# Patient Record
Sex: FEMALE | Race: WHITE | Employment: FULL TIME | URBAN - METROPOLITAN AREA
[De-identification: names, ages, dates, MRNs, and addresses within clinical notes are randomized per-mention and may not be internally consistent; named-entity substitution may affect disease eponyms.]

---

## 2017-01-01 ENCOUNTER — APPOINTMENT (OUTPATIENT)
Dept: LAB | Facility: HOSPITAL | Age: 67
End: 2017-01-01
Attending: UROLOGY
Payer: MEDICARE

## 2017-01-01 ENCOUNTER — APPOINTMENT (OUTPATIENT)
Dept: CT IMAGING | Facility: HOSPITAL | Age: 67
End: 2017-01-01
Attending: EMERGENCY MEDICINE

## 2017-01-01 ENCOUNTER — TELEPHONE (OUTPATIENT)
Dept: SURGERY | Facility: CLINIC | Age: 67
End: 2017-01-01

## 2017-01-01 ENCOUNTER — LAB ENCOUNTER (OUTPATIENT)
Dept: LAB | Facility: HOSPITAL | Age: 67
End: 2017-01-01
Attending: INTERNAL MEDICINE
Payer: MEDICARE

## 2017-01-01 ENCOUNTER — HOSPITAL ENCOUNTER (EMERGENCY)
Facility: HOSPITAL | Age: 67
Discharge: HOME | End: 2017-01-01
Attending: EMERGENCY MEDICINE

## 2017-01-01 ENCOUNTER — APPOINTMENT (OUTPATIENT)
Dept: INTERVENTIONAL RADIOLOGY/VASCULAR | Facility: HOSPITAL | Age: 67
DRG: 853 | End: 2017-01-01
Attending: UROLOGY
Payer: MEDICARE

## 2017-01-01 ENCOUNTER — APPOINTMENT (OUTPATIENT)
Dept: CV DIAGNOSTICS | Facility: HOSPITAL | Age: 67
DRG: 853 | End: 2017-01-01
Attending: INTERNAL MEDICINE
Payer: MEDICARE

## 2017-01-01 ENCOUNTER — APPOINTMENT (OUTPATIENT)
Dept: LAB | Age: 67
End: 2017-01-01
Attending: UROLOGY
Payer: MEDICARE

## 2017-01-01 ENCOUNTER — APPOINTMENT (OUTPATIENT)
Dept: CT IMAGING | Facility: HOSPITAL | Age: 67
DRG: 853 | End: 2017-01-01
Attending: UROLOGY
Payer: MEDICARE

## 2017-01-01 ENCOUNTER — HOSPITAL ENCOUNTER (INPATIENT)
Facility: HOSPITAL | Age: 67
LOS: 3 days | Discharge: HOSPICE | DRG: 853 | End: 2017-01-01
Attending: EMERGENCY MEDICINE | Admitting: INTERNAL MEDICINE
Payer: MEDICARE

## 2017-01-01 ENCOUNTER — HOSPITAL ENCOUNTER (INPATIENT)
Facility: HOSPITAL | Age: 67
LOS: 1 days | DRG: 871 | End: 2017-01-01
Attending: INTERNAL MEDICINE | Admitting: INTERNAL MEDICINE
Payer: OTHER MISCELLANEOUS

## 2017-01-01 ENCOUNTER — APPOINTMENT (OUTPATIENT)
Dept: GENERAL RADIOLOGY | Facility: HOSPITAL | Age: 67
DRG: 853 | End: 2017-01-01
Attending: EMERGENCY MEDICINE
Payer: MEDICARE

## 2017-01-01 ENCOUNTER — APPOINTMENT (OUTPATIENT)
Dept: ULTRASOUND IMAGING | Facility: HOSPITAL | Age: 67
DRG: 853 | End: 2017-01-01
Attending: INTERNAL MEDICINE
Payer: MEDICARE

## 2017-01-01 ENCOUNTER — OFFICE VISIT (OUTPATIENT)
Dept: SURGERY | Facility: CLINIC | Age: 67
End: 2017-01-01

## 2017-01-01 ENCOUNTER — APPOINTMENT (OUTPATIENT)
Dept: GENERAL RADIOLOGY | Facility: HOSPITAL | Age: 67
DRG: 853 | End: 2017-01-01
Attending: INTERNAL MEDICINE
Payer: MEDICARE

## 2017-01-01 ENCOUNTER — OFFICE VISIT (OUTPATIENT)
Dept: PAIN CLINIC | Facility: HOSPITAL | Age: 67
End: 2017-01-01
Attending: ANESTHESIOLOGY
Payer: MEDICARE

## 2017-01-01 ENCOUNTER — APPOINTMENT (OUTPATIENT)
Dept: CT IMAGING | Facility: HOSPITAL | Age: 67
DRG: 853 | End: 2017-01-01
Attending: EMERGENCY MEDICINE
Payer: MEDICARE

## 2017-01-01 ENCOUNTER — HOSPITAL ENCOUNTER (OUTPATIENT)
Dept: MRI IMAGING | Facility: HOSPITAL | Age: 67
Discharge: HOME | End: 2017-01-01
Attending: OBSTETRICS & GYNECOLOGY
Payer: MEDICARE

## 2017-01-01 ENCOUNTER — TELEPHONE (OUTPATIENT)
Dept: HEMATOLOGY/ONCOLOGY | Facility: HOSPITAL | Age: 67
End: 2017-01-01

## 2017-01-01 ENCOUNTER — TELEPHONE (OUTPATIENT)
Dept: PAIN CLINIC | Facility: HOSPITAL | Age: 67
End: 2017-01-01

## 2017-01-01 ENCOUNTER — HOSPITAL ENCOUNTER (EMERGENCY)
Facility: HOSPITAL | Age: 67
Discharge: HOME | End: 2017-01-01
Attending: EMERGENCY MEDICINE
Payer: MEDICARE

## 2017-01-01 VITALS
WEIGHT: 182 LBS | DIASTOLIC BLOOD PRESSURE: 82 MMHG | TEMPERATURE: 98 F | SYSTOLIC BLOOD PRESSURE: 138 MMHG | RESPIRATION RATE: 16 BRPM | HEIGHT: 62 IN | HEART RATE: 83 BPM | BODY MASS INDEX: 33.49 KG/M2

## 2017-01-01 VITALS
SYSTOLIC BLOOD PRESSURE: 136 MMHG | BODY MASS INDEX: 33.13 KG/M2 | TEMPERATURE: 98 F | RESPIRATION RATE: 16 BRPM | WEIGHT: 180 LBS | DIASTOLIC BLOOD PRESSURE: 74 MMHG | HEIGHT: 62 IN | HEART RATE: 82 BPM

## 2017-01-01 VITALS
DIASTOLIC BLOOD PRESSURE: 79 MMHG | RESPIRATION RATE: 16 BRPM | SYSTOLIC BLOOD PRESSURE: 132 MMHG | BODY MASS INDEX: 33.99 KG/M2 | HEART RATE: 92 BPM | WEIGHT: 180 LBS | TEMPERATURE: 98 F | HEIGHT: 61 IN

## 2017-01-01 VITALS
HEART RATE: 90 BPM | DIASTOLIC BLOOD PRESSURE: 66 MMHG | TEMPERATURE: 98 F | SYSTOLIC BLOOD PRESSURE: 135 MMHG | OXYGEN SATURATION: 97 % | BODY MASS INDEX: 34 KG/M2 | WEIGHT: 180 LBS | RESPIRATION RATE: 16 BRPM

## 2017-01-01 VITALS
BODY MASS INDEX: 33.99 KG/M2 | HEIGHT: 61 IN | DIASTOLIC BLOOD PRESSURE: 53 MMHG | RESPIRATION RATE: 16 BRPM | SYSTOLIC BLOOD PRESSURE: 133 MMHG | WEIGHT: 180 LBS | HEART RATE: 107 BPM | OXYGEN SATURATION: 96 % | TEMPERATURE: 99 F

## 2017-01-01 VITALS
HEART RATE: 97 BPM | RESPIRATION RATE: 16 BRPM | BODY MASS INDEX: 35 KG/M2 | DIASTOLIC BLOOD PRESSURE: 79 MMHG | SYSTOLIC BLOOD PRESSURE: 131 MMHG | WEIGHT: 183 LBS

## 2017-01-01 VITALS
SYSTOLIC BLOOD PRESSURE: 121 MMHG | TEMPERATURE: 97 F | RESPIRATION RATE: 21 BRPM | DIASTOLIC BLOOD PRESSURE: 48 MMHG | HEART RATE: 103 BPM | HEIGHT: 62 IN | OXYGEN SATURATION: 94 % | BODY MASS INDEX: 33.13 KG/M2 | WEIGHT: 180 LBS

## 2017-01-01 VITALS
BODY MASS INDEX: 33.99 KG/M2 | WEIGHT: 180 LBS | HEART RATE: 82 BPM | SYSTOLIC BLOOD PRESSURE: 142 MMHG | HEIGHT: 61 IN | DIASTOLIC BLOOD PRESSURE: 86 MMHG

## 2017-01-01 VITALS — OXYGEN SATURATION: 94 %

## 2017-01-01 DIAGNOSIS — M79.604 PAIN IN BOTH LOWER EXTREMITIES: ICD-10-CM

## 2017-01-01 DIAGNOSIS — N36.9 URETHRA DISORDER: ICD-10-CM

## 2017-01-01 DIAGNOSIS — N30.00 ACUTE CYSTITIS WITHOUT HEMATURIA: Primary | ICD-10-CM

## 2017-01-01 DIAGNOSIS — R52 PAIN: ICD-10-CM

## 2017-01-01 DIAGNOSIS — E86.0 DEHYDRATION: Primary | ICD-10-CM

## 2017-01-01 DIAGNOSIS — R31.9 URINARY TRACT INFECTION WITH HEMATURIA, SITE UNSPECIFIED: ICD-10-CM

## 2017-01-01 DIAGNOSIS — N32.89 BLADDER MASS: Primary | ICD-10-CM

## 2017-01-01 DIAGNOSIS — R35.0 URINE FREQUENCY: Primary | ICD-10-CM

## 2017-01-01 DIAGNOSIS — I10 ESSENTIAL HYPERTENSION, BENIGN: ICD-10-CM

## 2017-01-01 DIAGNOSIS — R73.09 OTHER ABNORMAL GLUCOSE: ICD-10-CM

## 2017-01-01 DIAGNOSIS — N30.00 ACUTE CYSTITIS WITHOUT HEMATURIA: ICD-10-CM

## 2017-01-01 DIAGNOSIS — M54.50 CHRONIC MIDLINE LOW BACK PAIN WITHOUT SCIATICA: Primary | ICD-10-CM

## 2017-01-01 DIAGNOSIS — C80.1 SMALL CELL CARCINOMA (HCC): ICD-10-CM

## 2017-01-01 DIAGNOSIS — R25.2 LEG CRAMPS: Primary | ICD-10-CM

## 2017-01-01 DIAGNOSIS — N39.0 URINARY TRACT INFECTION WITH HEMATURIA, SITE UNSPECIFIED: ICD-10-CM

## 2017-01-01 DIAGNOSIS — R31.0 GROSS HEMATURIA: ICD-10-CM

## 2017-01-01 DIAGNOSIS — R19.07 ABDOMINAL SWELLING, GENERALIZED: ICD-10-CM

## 2017-01-01 DIAGNOSIS — R39.9 SYMPTOMS INVOLVING URINARY SYSTEM: ICD-10-CM

## 2017-01-01 DIAGNOSIS — R31.0 GROSS HEMATURIA: Primary | ICD-10-CM

## 2017-01-01 DIAGNOSIS — N81.6 PROCTOCELE: Primary | ICD-10-CM

## 2017-01-01 DIAGNOSIS — R11.0 NAUSEA: ICD-10-CM

## 2017-01-01 DIAGNOSIS — R11.2 NAUSEA AND VOMITING, INTRACTABILITY OF VOMITING NOT SPECIFIED, UNSPECIFIED VOMITING TYPE: ICD-10-CM

## 2017-01-01 DIAGNOSIS — G89.29 CHRONIC MIDLINE LOW BACK PAIN WITHOUT SCIATICA: Primary | ICD-10-CM

## 2017-01-01 DIAGNOSIS — N39.0 URINARY TRACT INFECTION: Primary | ICD-10-CM

## 2017-01-01 DIAGNOSIS — R10.2 SUPRAPUBIC PAIN: ICD-10-CM

## 2017-01-01 DIAGNOSIS — M79.605 PAIN IN BOTH LOWER EXTREMITIES: ICD-10-CM

## 2017-01-01 DIAGNOSIS — N32.89 BLADDER MASS: ICD-10-CM

## 2017-01-01 DIAGNOSIS — R82.90 URINE FINDING: ICD-10-CM

## 2017-01-01 DIAGNOSIS — N30.01 ACUTE CYSTITIS WITH HEMATURIA: Primary | ICD-10-CM

## 2017-01-01 DIAGNOSIS — R31.9 HEMATURIA, UNSPECIFIED: ICD-10-CM

## 2017-01-01 LAB
ALBUMIN SERPL BCP-MCNC: 3.5 G/DL (ref 3.5–4.8)
ALBUMIN/GLOB SERPL: 0.9 {RATIO} (ref 1–2)
ALP SERPL-CCNC: 44 U/L (ref 32–100)
ALT SERPL-CCNC: 18 U/L (ref 14–54)
ANION GAP SERPL CALC-SCNC: 15 MMOL/L (ref 0–18)
APPEARANCE: CLEAR
APPEARANCE: CLEAR
AST SERPL-CCNC: 22 U/L (ref 15–41)
BACTERIA UR QL AUTO: NEGATIVE /HPF
BASOPHILS # BLD: 0 K/UL (ref 0–0.2)
BASOPHILS NFR BLD: 0 %
BILIRUB SERPL-MCNC: 1.5 MG/DL (ref 0.3–1.2)
BILIRUB UR QL: NEGATIVE
BILIRUB UR QL: NEGATIVE
BUN SERPL-MCNC: 14 MG/DL (ref 8–20)
BUN/CREAT SERPL: 17.7 (ref 10–20)
CALCIUM SERPL-MCNC: 8.9 MG/DL (ref 8.5–10.5)
CHLORIDE SERPL-SCNC: 99 MMOL/L (ref 95–110)
CO2 SERPL-SCNC: 23 MMOL/L (ref 22–32)
CREAT SERPL-MCNC: 0.79 MG/DL (ref 0.5–1.5)
EOSINOPHIL # BLD: 0 K/UL (ref 0–0.7)
EOSINOPHIL NFR BLD: 0 %
ERYTHROCYTE [DISTWIDTH] IN BLOOD BY AUTOMATED COUNT: 13.6 % (ref 11–15)
GLOBULIN PLAS-MCNC: 4 G/DL (ref 2.5–3.7)
GLUCOSE SERPL-MCNC: 120 MG/DL (ref 70–99)
GLUCOSE UR-MCNC: NEGATIVE MG/DL
GLUCOSE UR-MCNC: NEGATIVE MG/DL
HCT VFR BLD AUTO: 35.9 % (ref 35–48)
HGB BLD-MCNC: 12.4 G/DL (ref 12–16)
KETONES UR-MCNC: NEGATIVE MG/DL
LYMPHOCYTES # BLD: 0.9 K/UL (ref 1–4)
LYMPHOCYTES NFR BLD: 10 %
MCH RBC QN AUTO: 30.7 PG (ref 27–32)
MCHC RBC AUTO-ENTMCNC: 34.4 G/DL (ref 32–37)
MCV RBC AUTO: 89.1 FL (ref 80–100)
MONOCYTES # BLD: 0.9 K/UL (ref 0–1)
MONOCYTES NFR BLD: 10 %
MULTISTIX LOT#: NORMAL NUMERIC
MULTISTIX LOT#: NORMAL NUMERIC
NEUTROPHILS # BLD AUTO: 7 K/UL (ref 1.8–7.7)
NEUTROPHILS NFR BLD: 79 %
NITRITE UR QL STRIP.AUTO: NEGATIVE
OSMOLALITY UR CALC.SUM OF ELEC: 286 MOSM/KG (ref 275–295)
PH UR: 5 [PH] (ref 5–8)
PH UR: 5 [PH] (ref 5–8)
PH, URINE: 5.5 (ref 4.5–8)
PH, URINE: 6 (ref 4.5–8)
PLATELET # BLD AUTO: 219 K/UL (ref 140–400)
PMV BLD AUTO: 7.5 FL (ref 7.4–10.3)
POTASSIUM SERPL-SCNC: 3.6 MMOL/L (ref 3.3–5.1)
PROT SERPL-MCNC: 7.5 G/DL (ref 5.9–8.4)
PROT UR-MCNC: 100 MG/DL
PROT UR-MCNC: >=500 MG/DL
RBC # BLD AUTO: 4.03 M/UL (ref 3.7–5.4)
RBC #/AREA URNS AUTO: 1052 /HPF
RBC #/AREA URNS AUTO: 2467 /HPF
SODIUM SERPL-SCNC: 137 MMOL/L (ref 136–144)
SP GR UR STRIP: 1.02 (ref 1–1.03)
SP GR UR STRIP: 1.03 (ref 1–1.03)
SPECIFIC GRAVITY: 1.02 (ref 1–1.03)
SPECIFIC GRAVITY: 1.03 (ref 1–1.03)
URINE-COLOR: YELLOW
URINE-COLOR: YELLOW
UROBILINOGEN UR STRIP-ACNC: <2
UROBILINOGEN,SEMI-QN: 0.2 MG/DL (ref 0–1.9)
UROBILINOGEN,SEMI-QN: 0.2 MG/DL (ref 0–1.9)
VIT C UR-MCNC: NEGATIVE MG/DL
WBC # BLD AUTO: 8.9 K/UL (ref 4–11)
WBC #/AREA URNS AUTO: 1291 /HPF
WBC #/AREA URNS AUTO: 148 /HPF

## 2017-01-01 PROCEDURE — 76770 US EXAM ABDO BACK WALL COMP: CPT | Performed by: INTERNAL MEDICINE

## 2017-01-01 PROCEDURE — 87086 URINE CULTURE/COLONY COUNT: CPT

## 2017-01-01 PROCEDURE — 99222 1ST HOSP IP/OBS MODERATE 55: CPT | Performed by: INTERNAL MEDICINE

## 2017-01-01 PROCEDURE — 93306 TTE W/DOPPLER COMPLETE: CPT | Performed by: INTERNAL MEDICINE

## 2017-01-01 PROCEDURE — 80053 COMPREHEN METABOLIC PANEL: CPT

## 2017-01-01 PROCEDURE — 99284 EMERGENCY DEPT VISIT MOD MDM: CPT

## 2017-01-01 PROCEDURE — 81003 URINALYSIS AUTO W/O SCOPE: CPT | Performed by: UROLOGY

## 2017-01-01 PROCEDURE — 88108 CYTOPATH CONCENTRATE TECH: CPT

## 2017-01-01 PROCEDURE — 96374 THER/PROPH/DIAG INJ IV PUSH: CPT

## 2017-01-01 PROCEDURE — 99223 1ST HOSP IP/OBS HIGH 75: CPT | Performed by: UROLOGY

## 2017-01-01 PROCEDURE — 80061 LIPID PANEL: CPT

## 2017-01-01 PROCEDURE — A9575 INJ GADOTERATE MEGLUMI 0.1ML: HCPCS | Performed by: OBSTETRICS & GYNECOLOGY

## 2017-01-01 PROCEDURE — 99213 OFFICE O/P EST LOW 20 MIN: CPT | Performed by: UROLOGY

## 2017-01-01 PROCEDURE — 99233 SBSQ HOSP IP/OBS HIGH 50: CPT | Performed by: INTERNAL MEDICINE

## 2017-01-01 PROCEDURE — 74176 CT ABD & PELVIS W/O CONTRAST: CPT

## 2017-01-01 PROCEDURE — 99232 SBSQ HOSP IP/OBS MODERATE 35: CPT | Performed by: UROLOGY

## 2017-01-01 PROCEDURE — 85027 COMPLETE CBC AUTOMATED: CPT | Performed by: EMERGENCY MEDICINE

## 2017-01-01 PROCEDURE — 81001 URINALYSIS AUTO W/SCOPE: CPT

## 2017-01-01 PROCEDURE — G0463 HOSPITAL OUTPT CLINIC VISIT: HCPCS | Performed by: UROLOGY

## 2017-01-01 PROCEDURE — 96361 HYDRATE IV INFUSION ADD-ON: CPT

## 2017-01-01 PROCEDURE — 83036 HEMOGLOBIN GLYCOSYLATED A1C: CPT

## 2017-01-01 PROCEDURE — 99221 1ST HOSP IP/OBS SF/LOW 40: CPT | Performed by: INTERNAL MEDICINE

## 2017-01-01 PROCEDURE — 71250 CT THORAX DX C-: CPT | Performed by: UROLOGY

## 2017-01-01 PROCEDURE — 079J3ZX DRAINAGE OF LEFT INGUINAL LYMPHATIC, PERCUTANEOUS APPROACH, DIAGNOSTIC: ICD-10-PCS | Performed by: RADIOLOGY

## 2017-01-01 PROCEDURE — 80053 COMPREHEN METABOLIC PANEL: CPT | Performed by: EMERGENCY MEDICINE

## 2017-01-01 PROCEDURE — 99232 SBSQ HOSP IP/OBS MODERATE 35: CPT | Performed by: INTERNAL MEDICINE

## 2017-01-01 PROCEDURE — 81002 URINALYSIS NONAUTO W/O SCOPE: CPT | Performed by: UROLOGY

## 2017-01-01 PROCEDURE — 99211 OFF/OP EST MAY X REQ PHY/QHP: CPT

## 2017-01-01 PROCEDURE — 72197 MRI PELVIS W/O & W/DYE: CPT | Performed by: OBSTETRICS & GYNECOLOGY

## 2017-01-01 PROCEDURE — 96376 TX/PRO/DX INJ SAME DRUG ADON: CPT

## 2017-01-01 PROCEDURE — 99233 SBSQ HOSP IP/OBS HIGH 50: CPT | Performed by: UROLOGY

## 2017-01-01 PROCEDURE — 83735 ASSAY OF MAGNESIUM: CPT

## 2017-01-01 PROCEDURE — 99233 SBSQ HOSP IP/OBS HIGH 50: CPT | Performed by: NURSE PRACTITIONER

## 2017-01-01 PROCEDURE — 99214 OFFICE O/P EST MOD 30 MIN: CPT | Performed by: UROLOGY

## 2017-01-01 PROCEDURE — 99285 EMERGENCY DEPT VISIT HI MDM: CPT

## 2017-01-01 PROCEDURE — 81001 URINALYSIS AUTO W/SCOPE: CPT | Performed by: EMERGENCY MEDICINE

## 2017-01-01 PROCEDURE — 36415 COLL VENOUS BLD VENIPUNCTURE: CPT

## 2017-01-01 PROCEDURE — 71010 XR CHEST AP PORTABLE  (CPT=71010): CPT | Performed by: INTERNAL MEDICINE

## 2017-01-01 PROCEDURE — 87086 URINE CULTURE/COLONY COUNT: CPT | Performed by: EMERGENCY MEDICINE

## 2017-01-01 PROCEDURE — 93005 ELECTROCARDIOGRAM TRACING: CPT

## 2017-01-01 PROCEDURE — 85025 COMPLETE CBC W/AUTO DIFF WBC: CPT | Performed by: EMERGENCY MEDICINE

## 2017-01-01 PROCEDURE — 87077 CULTURE AEROBIC IDENTIFY: CPT | Performed by: EMERGENCY MEDICINE

## 2017-01-01 PROCEDURE — 85007 BL SMEAR W/DIFF WBC COUNT: CPT | Performed by: EMERGENCY MEDICINE

## 2017-01-01 PROCEDURE — 99291 CRITICAL CARE FIRST HOUR: CPT | Performed by: HOSPITALIST

## 2017-01-01 PROCEDURE — 99356 PROLONGED SERV,INPATIENT,1ST HR: CPT | Performed by: UROLOGY

## 2017-01-01 PROCEDURE — 99222 1ST HOSP IP/OBS MODERATE 55: CPT | Performed by: NURSE PRACTITIONER

## 2017-01-01 PROCEDURE — 85025 COMPLETE CBC W/AUTO DIFF WBC: CPT

## 2017-01-01 PROCEDURE — 80048 BASIC METABOLIC PNL TOTAL CA: CPT

## 2017-01-01 PROCEDURE — 71010 XR CHEST AP PORTABLE  (CPT=71010): CPT | Performed by: EMERGENCY MEDICINE

## 2017-01-01 PROCEDURE — 74176 CT ABD & PELVIS W/O CONTRAST: CPT | Performed by: EMERGENCY MEDICINE

## 2017-01-01 PROCEDURE — 96375 TX/PRO/DX INJ NEW DRUG ADDON: CPT

## 2017-01-01 PROCEDURE — 93010 ELECTROCARDIOGRAM REPORT: CPT | Performed by: EMERGENCY MEDICINE

## 2017-01-01 RX ORDER — SODIUM POLYSTYRENE SULFONATE 15 G/60ML
30 SUSPENSION ORAL; RECTAL ONCE
Status: COMPLETED | OUTPATIENT
Start: 2017-01-01 | End: 2017-01-01

## 2017-01-01 RX ORDER — NITROFURANTOIN 25; 75 MG/1; MG/1
100 CAPSULE ORAL NIGHTLY
Qty: 30 CAPSULE | Refills: 11 | Status: SHIPPED | OUTPATIENT
Start: 2017-01-01 | End: 2017-01-01

## 2017-01-01 RX ORDER — ATROPINE SULFATE 10 MG/ML
2 SOLUTION/ DROPS OPHTHALMIC EVERY 2 HOUR PRN
Status: DISCONTINUED | OUTPATIENT
Start: 2017-01-01 | End: 2017-01-01

## 2017-01-01 RX ORDER — DOCUSATE SODIUM 100 MG/1
100 CAPSULE, LIQUID FILLED ORAL 2 TIMES DAILY
Status: DISCONTINUED | OUTPATIENT
Start: 2017-01-01 | End: 2017-01-01

## 2017-01-01 RX ORDER — MORPHINE SULFATE 4 MG/ML
4 INJECTION, SOLUTION INTRAMUSCULAR; INTRAVENOUS ONCE
Status: COMPLETED | OUTPATIENT
Start: 2017-01-01 | End: 2017-01-01

## 2017-01-01 RX ORDER — MORPHINE SULFATE 2 MG/ML
2 INJECTION, SOLUTION INTRAMUSCULAR; INTRAVENOUS ONCE
Status: COMPLETED | OUTPATIENT
Start: 2017-01-01 | End: 2017-01-01

## 2017-01-01 RX ORDER — ACETAMINOPHEN 650 MG/1
650 SUPPOSITORY RECTAL EVERY 6 HOURS SCHEDULED
Status: DISCONTINUED | OUTPATIENT
Start: 2017-01-01 | End: 2017-01-01

## 2017-01-01 RX ORDER — DEXTROSE MONOHYDRATE 25 G/50ML
INJECTION, SOLUTION INTRAVENOUS
Status: COMPLETED
Start: 2017-01-01 | End: 2017-01-01

## 2017-01-01 RX ORDER — ONDANSETRON 2 MG/ML
4 INJECTION INTRAMUSCULAR; INTRAVENOUS EVERY 4 HOURS PRN
Status: DISCONTINUED | OUTPATIENT
Start: 2017-01-01 | End: 2017-01-01

## 2017-01-01 RX ORDER — BISACODYL 10 MG
10 SUPPOSITORY, RECTAL RECTAL
Status: DISCONTINUED | OUTPATIENT
Start: 2017-01-01 | End: 2017-01-01

## 2017-01-01 RX ORDER — DILTIAZEM HYDROCHLORIDE 60 MG/1
60 TABLET, FILM COATED ORAL EVERY 6 HOURS SCHEDULED
Status: DISCONTINUED | OUTPATIENT
Start: 2017-01-01 | End: 2017-01-01

## 2017-01-01 RX ORDER — LORAZEPAM 2 MG/ML
2 INJECTION INTRAMUSCULAR EVERY 4 HOURS PRN
Status: DISCONTINUED | OUTPATIENT
Start: 2017-01-01 | End: 2017-01-01

## 2017-01-01 RX ORDER — GLYCOPYRROLATE 0.2 MG/ML
0.4 INJECTION, SOLUTION INTRAMUSCULAR; INTRAVENOUS
Status: DISCONTINUED | OUTPATIENT
Start: 2017-01-01 | End: 2017-01-01

## 2017-01-01 RX ORDER — DILTIAZEM HYDROCHLORIDE 5 MG/ML
10 INJECTION INTRAVENOUS ONCE
Status: DISCONTINUED | OUTPATIENT
Start: 2017-01-01 | End: 2017-01-01

## 2017-01-01 RX ORDER — MORPHINE SULFATE 2 MG/ML
1 INJECTION, SOLUTION INTRAMUSCULAR; INTRAVENOUS
Status: DISCONTINUED | OUTPATIENT
Start: 2017-01-01 | End: 2017-01-01

## 2017-01-01 RX ORDER — METOCLOPRAMIDE HYDROCHLORIDE 5 MG/ML
5 INJECTION INTRAMUSCULAR; INTRAVENOUS
Status: DISCONTINUED | OUTPATIENT
Start: 2017-01-01 | End: 2017-01-01

## 2017-01-01 RX ORDER — FLUCONAZOLE 2 MG/ML
400 INJECTION, SOLUTION INTRAVENOUS ONCE
Status: COMPLETED | OUTPATIENT
Start: 2017-01-01 | End: 2017-01-01

## 2017-01-01 RX ORDER — HEPARIN SODIUM AND DEXTROSE 10000; 5 [USP'U]/100ML; G/100ML
12 INJECTION INTRAVENOUS ONCE
Status: DISCONTINUED | OUTPATIENT
Start: 2017-01-01 | End: 2017-01-01

## 2017-01-01 RX ORDER — ATORVASTATIN CALCIUM 10 MG/1
10 TABLET, FILM COATED ORAL NIGHTLY
Status: DISCONTINUED | OUTPATIENT
Start: 2017-01-01 | End: 2017-01-01

## 2017-01-01 RX ORDER — DEXTROSE AND SODIUM CHLORIDE 5; .9 G/100ML; G/100ML
INJECTION, SOLUTION INTRAVENOUS CONTINUOUS
Status: DISCONTINUED | OUTPATIENT
Start: 2017-01-01 | End: 2017-01-01

## 2017-01-01 RX ORDER — SODIUM CHLORIDE 0.9 % (FLUSH) 0.9 %
10 SYRINGE (ML) INJECTION AS NEEDED
Status: DISCONTINUED | OUTPATIENT
Start: 2017-01-01 | End: 2017-01-01

## 2017-01-01 RX ORDER — DIGOXIN 0.25 MG/ML
250 INJECTION INTRAMUSCULAR; INTRAVENOUS ONCE
Status: COMPLETED | OUTPATIENT
Start: 2017-01-01 | End: 2017-01-01

## 2017-01-01 RX ORDER — HYDROMORPHONE HYDROCHLORIDE 1 MG/ML
0.5 INJECTION, SOLUTION INTRAMUSCULAR; INTRAVENOUS; SUBCUTANEOUS EVERY 2 HOUR PRN
Status: DISCONTINUED | OUTPATIENT
Start: 2017-01-01 | End: 2017-01-01

## 2017-01-01 RX ORDER — DILTIAZEM HYDROCHLORIDE 5 MG/ML
5 INJECTION INTRAVENOUS ONCE
Status: DISCONTINUED | OUTPATIENT
Start: 2017-01-01 | End: 2017-01-01

## 2017-01-01 RX ORDER — DOXEPIN HYDROCHLORIDE 50 MG/1
1 CAPSULE ORAL DAILY
Status: DISCONTINUED | OUTPATIENT
Start: 2017-01-01 | End: 2017-01-01

## 2017-01-01 RX ORDER — SODIUM CHLORIDE 9 MG/ML
INJECTION, SOLUTION INTRAVENOUS ONCE
Status: COMPLETED | OUTPATIENT
Start: 2017-01-01 | End: 2017-01-01

## 2017-01-01 RX ORDER — LORAZEPAM 2 MG/ML
0.5 INJECTION INTRAMUSCULAR ONCE
Status: COMPLETED | OUTPATIENT
Start: 2017-01-01 | End: 2017-01-01

## 2017-01-01 RX ORDER — SODIUM CHLORIDE 9 MG/ML
INJECTION, SOLUTION INTRAVENOUS CONTINUOUS
Status: DISCONTINUED | OUTPATIENT
Start: 2017-01-01 | End: 2017-01-01

## 2017-01-01 RX ORDER — SCOLOPAMINE TRANSDERMAL SYSTEM 1 MG/1
1 PATCH, EXTENDED RELEASE TRANSDERMAL
Status: DISCONTINUED | OUTPATIENT
Start: 2017-01-01 | End: 2017-01-01

## 2017-01-01 RX ORDER — HALOPERIDOL 5 MG/ML
1 INJECTION INTRAMUSCULAR
Status: DISCONTINUED | OUTPATIENT
Start: 2017-01-01 | End: 2017-01-01

## 2017-01-01 RX ORDER — LEVOFLOXACIN 5 MG/ML
500 INJECTION, SOLUTION INTRAVENOUS ONCE
Status: COMPLETED | OUTPATIENT
Start: 2017-01-01 | End: 2017-01-01

## 2017-01-01 RX ORDER — METOPROLOL TARTRATE 5 MG/5ML
INJECTION INTRAVENOUS
Status: COMPLETED
Start: 2017-01-01 | End: 2017-01-01

## 2017-01-01 RX ORDER — ONDANSETRON 2 MG/ML
4 INJECTION INTRAMUSCULAR; INTRAVENOUS EVERY 6 HOURS PRN
Status: DISCONTINUED | OUTPATIENT
Start: 2017-01-01 | End: 2017-01-01

## 2017-01-01 RX ORDER — DEXTROSE MONOHYDRATE 100 MG/ML
INJECTION, SOLUTION INTRAVENOUS CONTINUOUS
Status: DISCONTINUED | OUTPATIENT
Start: 2017-01-01 | End: 2017-01-01

## 2017-01-01 RX ORDER — FLUCONAZOLE 2 MG/ML
400 INJECTION, SOLUTION INTRAVENOUS EVERY 24 HOURS
Status: DISCONTINUED | OUTPATIENT
Start: 2017-01-01 | End: 2017-01-01

## 2017-01-01 RX ORDER — ONDANSETRON 4 MG/1
4 TABLET, ORALLY DISINTEGRATING ORAL EVERY 6 HOURS PRN
Status: DISCONTINUED | OUTPATIENT
Start: 2017-01-01 | End: 2017-01-01

## 2017-01-01 RX ORDER — HYDROMORPHONE HYDROCHLORIDE 1 MG/ML
0.5 INJECTION, SOLUTION INTRAMUSCULAR; INTRAVENOUS; SUBCUTANEOUS ONCE
Status: COMPLETED | OUTPATIENT
Start: 2017-01-01 | End: 2017-01-01

## 2017-01-01 RX ORDER — NITROFURANTOIN 25; 75 MG/1; MG/1
100 CAPSULE ORAL 2 TIMES DAILY
Qty: 10 CAPSULE | Refills: 0 | Status: SHIPPED | OUTPATIENT
Start: 2017-01-01 | End: 2017-01-01

## 2017-01-01 RX ORDER — FUROSEMIDE 10 MG/ML
40 INJECTION INTRAMUSCULAR; INTRAVENOUS EVERY 8 HOURS PRN
Status: DISCONTINUED | OUTPATIENT
Start: 2017-01-01 | End: 2017-01-01

## 2017-01-01 RX ORDER — CETIRIZINE HYDROCHLORIDE 5 MG/1
5 TABLET ORAL DAILY PRN
Status: DISCONTINUED | OUTPATIENT
Start: 2017-01-01 | End: 2017-01-01

## 2017-01-01 RX ORDER — FLUCONAZOLE 200 MG/1
200 TABLET ORAL DAILY
Qty: 14 TABLET | Refills: 0 | Status: ON HOLD | OUTPATIENT
Start: 2017-01-01 | End: 2017-01-01

## 2017-01-01 RX ORDER — 0.9 % SODIUM CHLORIDE 0.9 %
VIAL (ML) INJECTION
Status: COMPLETED
Start: 2017-01-01 | End: 2017-01-01

## 2017-01-01 RX ORDER — LORAZEPAM 2 MG/ML
0.5 INJECTION INTRAMUSCULAR EVERY 4 HOURS PRN
Status: DISCONTINUED | OUTPATIENT
Start: 2017-01-01 | End: 2017-01-01

## 2017-01-01 RX ORDER — FLUCONAZOLE 100 MG/1
TABLET ORAL
Refills: 0 | Status: ON HOLD | COMMUNITY
Start: 2017-01-01 | End: 2017-01-01

## 2017-01-01 RX ORDER — LORAZEPAM 2 MG/ML
1 INJECTION INTRAMUSCULAR EVERY 4 HOURS PRN
Status: DISCONTINUED | OUTPATIENT
Start: 2017-01-01 | End: 2017-01-01

## 2017-01-01 RX ORDER — HYDROMORPHONE HYDROCHLORIDE 1 MG/ML
0.5 INJECTION, SOLUTION INTRAMUSCULAR; INTRAVENOUS; SUBCUTANEOUS EVERY 4 HOURS PRN
Status: DISCONTINUED | OUTPATIENT
Start: 2017-01-01 | End: 2017-01-01

## 2017-01-01 RX ORDER — NITROFURANTOIN MACROCRYSTALS 100 MG/1
CAPSULE ORAL
Refills: 0 | Status: ON HOLD | COMMUNITY
Start: 2017-01-01 | End: 2017-01-01

## 2017-01-01 RX ORDER — NITROFURANTOIN 25; 75 MG/1; MG/1
100 CAPSULE ORAL 2 TIMES DAILY
Qty: 20 CAPSULE | Refills: 0 | Status: SHIPPED | OUTPATIENT
Start: 2017-01-01 | End: 2017-01-01

## 2017-01-01 RX ORDER — FUROSEMIDE 40 MG/1
40 TABLET ORAL EVERY 8 HOURS PRN
Status: DISCONTINUED | OUTPATIENT
Start: 2017-01-01 | End: 2017-01-01

## 2017-01-01 RX ORDER — ACETAMINOPHEN 160 MG/5ML
650 SOLUTION ORAL EVERY 6 HOURS SCHEDULED
Status: DISCONTINUED | OUTPATIENT
Start: 2017-01-01 | End: 2017-01-01

## 2017-01-01 RX ORDER — CEPHALEXIN 250 MG/1
250 CAPSULE ORAL NIGHTLY
Qty: 30 CAPSULE | Refills: 3 | Status: SHIPPED | OUTPATIENT
Start: 2017-01-01 | End: 2017-01-01

## 2017-01-01 RX ORDER — HEPARIN SODIUM 1000 [USP'U]/ML
60 INJECTION, SOLUTION INTRAVENOUS; SUBCUTANEOUS ONCE
Status: DISCONTINUED | OUTPATIENT
Start: 2017-01-01 | End: 2017-01-01

## 2017-01-01 RX ORDER — ONDANSETRON 4 MG/1
4 TABLET, FILM COATED ORAL EVERY 8 HOURS PRN
Status: DISCONTINUED | OUTPATIENT
Start: 2017-01-01 | End: 2017-01-01

## 2017-01-01 RX ORDER — MORPHINE SULFATE IN 0.9 % NACL 1 MG/ML
1 PLASTIC BAG, INJECTION (ML) INTRAVENOUS CONTINUOUS PRN
Status: DISCONTINUED | OUTPATIENT
Start: 2017-01-01 | End: 2017-01-01 | Stop reason: ALTCHOICE

## 2017-01-01 RX ORDER — HEPARIN SODIUM AND DEXTROSE 10000; 5 [USP'U]/100ML; G/100ML
INJECTION INTRAVENOUS CONTINUOUS
Status: DISCONTINUED | OUTPATIENT
Start: 2017-01-01 | End: 2017-01-01

## 2017-01-01 RX ORDER — CIPROFLOXACIN 500 MG/1
TABLET, FILM COATED ORAL
Qty: 14 TABLET | Refills: 0 | Status: SHIPPED | OUTPATIENT
Start: 2017-01-01 | End: 2017-01-01 | Stop reason: ALTCHOICE

## 2017-01-01 RX ORDER — DEXTROSE MONOHYDRATE 25 G/50ML
50 INJECTION, SOLUTION INTRAVENOUS ONCE
Status: COMPLETED | OUTPATIENT
Start: 2017-01-01 | End: 2017-01-01

## 2017-01-01 RX ORDER — 0.9 % SODIUM CHLORIDE 0.9 %
VIAL (ML) INJECTION
Status: DISPENSED
Start: 2017-01-01 | End: 2017-01-01

## 2017-01-01 RX ORDER — DEXTROSE MONOHYDRATE 25 G/50ML
50 INJECTION, SOLUTION INTRAVENOUS AS NEEDED
Status: DISCONTINUED | OUTPATIENT
Start: 2017-01-01 | End: 2017-01-01

## 2017-01-01 RX ORDER — HYDROCODONE BITARTRATE AND ACETAMINOPHEN 5; 325 MG/1; MG/1
1 TABLET ORAL EVERY 6 HOURS PRN
Status: DISCONTINUED | OUTPATIENT
Start: 2017-01-01 | End: 2017-01-01

## 2017-01-01 RX ORDER — ONDANSETRON 4 MG/1
4 TABLET, FILM COATED ORAL EVERY 8 HOURS PRN
Qty: 10 TABLET | Refills: 0 | Status: ON HOLD | OUTPATIENT
Start: 2017-01-01 | End: 2017-01-01

## 2017-01-01 RX ORDER — DILTIAZEM HYDROCHLORIDE 60 MG/1
60 TABLET, FILM COATED ORAL ONCE
Status: DISCONTINUED | OUTPATIENT
Start: 2017-01-01 | End: 2017-01-01

## 2017-01-01 RX ORDER — FLUCONAZOLE 2 MG/ML
400 INJECTION, SOLUTION INTRAVENOUS EVERY 24 HOURS
Status: CANCELLED | OUTPATIENT
Start: 2017-01-01

## 2017-01-01 RX ORDER — HALOPERIDOL 5 MG/ML
2 INJECTION INTRAMUSCULAR
Status: DISCONTINUED | OUTPATIENT
Start: 2017-01-01 | End: 2017-01-01

## 2017-01-01 RX ORDER — DEXTROSE AND SODIUM CHLORIDE 5; .9 G/100ML; G/100ML
INJECTION, SOLUTION INTRAVENOUS
Status: COMPLETED
Start: 2017-01-01 | End: 2017-01-01

## 2017-01-01 RX ORDER — TRAMADOL HYDROCHLORIDE 50 MG/1
TABLET ORAL EVERY 4 HOURS PRN
Qty: 20 TABLET | Refills: 0 | Status: ON HOLD | OUTPATIENT
Start: 2017-01-01 | End: 2017-01-01

## 2017-01-01 RX ORDER — MIRABEGRON 25 MG/1
TABLET, FILM COATED, EXTENDED RELEASE ORAL
Refills: 10 | Status: ON HOLD | COMMUNITY
Start: 2017-01-01 | End: 2017-01-01

## 2017-01-01 RX ORDER — ONDANSETRON 2 MG/ML
4 INJECTION INTRAMUSCULAR; INTRAVENOUS ONCE
Status: COMPLETED | OUTPATIENT
Start: 2017-01-01 | End: 2017-01-01

## 2017-01-01 RX ORDER — CETIRIZINE HYDROCHLORIDE 10 MG/1
10 TABLET ORAL 2 TIMES DAILY PRN
Status: ON HOLD | COMMUNITY
End: 2017-01-01

## 2017-01-01 RX ORDER — ONDANSETRON 2 MG/ML
INJECTION INTRAMUSCULAR; INTRAVENOUS
Status: COMPLETED
Start: 2017-01-01 | End: 2017-01-01

## 2017-01-01 RX ORDER — NITROFURANTOIN 25; 75 MG/1; MG/1
100 CAPSULE ORAL 2 TIMES DAILY
Status: DISCONTINUED | OUTPATIENT
Start: 2017-01-01 | End: 2017-01-01

## 2017-01-01 RX ORDER — MORPHINE SULFATE 2 MG/ML
INJECTION, SOLUTION INTRAMUSCULAR; INTRAVENOUS
Status: COMPLETED
Start: 2017-01-01 | End: 2017-01-01

## 2017-01-01 RX ORDER — CEPHALEXIN 250 MG/1
CAPSULE ORAL
Refills: 3 | Status: ON HOLD | COMMUNITY
Start: 2017-01-01 | End: 2017-01-01

## 2017-01-01 RX ORDER — CIPROFLOXACIN 250 MG/1
250 TABLET, FILM COATED ORAL 2 TIMES DAILY
Qty: 14 TABLET | Refills: 0 | Status: ON HOLD | OUTPATIENT
Start: 2017-01-01 | End: 2017-01-01

## 2017-01-01 RX ORDER — TRAMADOL HYDROCHLORIDE 50 MG/1
50 TABLET ORAL EVERY 8 HOURS PRN
Qty: 15 TABLET | Refills: 0 | Status: ON HOLD | OUTPATIENT
Start: 2017-01-01 | End: 2017-01-01

## 2017-01-01 RX ORDER — MORPHINE SULFATE 2 MG/ML
2 INJECTION, SOLUTION INTRAMUSCULAR; INTRAVENOUS EVERY 2 HOUR PRN
Status: DISCONTINUED | OUTPATIENT
Start: 2017-01-01 | End: 2017-01-01

## 2017-01-01 RX ORDER — HEPARIN SODIUM 5000 [USP'U]/ML
5000 INJECTION, SOLUTION INTRAVENOUS; SUBCUTANEOUS EVERY 12 HOURS SCHEDULED
Status: DISCONTINUED | OUTPATIENT
Start: 2017-01-01 | End: 2017-01-01

## 2017-01-01 RX ORDER — TRAMADOL HYDROCHLORIDE 50 MG/1
50 TABLET ORAL EVERY 8 HOURS PRN
Qty: 90 TABLET | Refills: 1 | Status: SHIPPED | OUTPATIENT
Start: 2017-01-01 | End: 2017-01-01

## 2017-01-01 RX ORDER — DILTIAZEM HYDROCHLORIDE 5 MG/ML
10 INJECTION INTRAVENOUS ONCE
Status: COMPLETED | OUTPATIENT
Start: 2017-01-01 | End: 2017-01-01

## 2017-01-01 RX ORDER — LORAZEPAM 2 MG/ML
INJECTION INTRAMUSCULAR
Status: COMPLETED
Start: 2017-01-01 | End: 2017-01-01

## 2017-01-01 RX ORDER — FLUCONAZOLE 100 MG/1
200 TABLET ORAL DAILY
Status: DISCONTINUED | OUTPATIENT
Start: 2017-01-01 | End: 2017-01-01

## 2017-01-01 RX ORDER — FLUCONAZOLE 2 MG/ML
400 INJECTION, SOLUTION INTRAVENOUS
Status: DISCONTINUED | OUTPATIENT
Start: 2017-01-01 | End: 2017-01-01

## 2017-01-01 RX ORDER — ACETAMINOPHEN 325 MG/1
650 TABLET ORAL ONCE
Status: COMPLETED | OUTPATIENT
Start: 2017-01-01 | End: 2017-01-01

## 2017-01-01 RX ORDER — DILTIAZEM HYDROCHLORIDE 5 MG/ML
INJECTION INTRAVENOUS
Status: COMPLETED
Start: 2017-01-01 | End: 2017-01-01

## 2017-01-01 NOTE — ED PROVIDER NOTES
Patient Seen in: [facility] AND [facility] Emergency Department    History   Patient presents with:  Urinary Symptoms (urologic)    Stated Complaint: Bleeding    HPI    The patient is a 78-year-old female with past history of hypertension, hyperlipidemia, multip 01/01/17 1102 108   Resp 01/01/17 1102 19   Temp 01/01/17 1102 98.1 °F (36.7 °C)   Temp src 01/01/17 1102 Oral   SpO2 01/01/17 1102 94 %   O2 Device 01/01/17 1507 None (Room air)       Current:/53 mmHg  Pulse 107  Temp(Src) 98.8 °F (37.1 °C) (Oral) Abnormal            Final result                 Please view results for these tests on the individual orders.    RAINBOW DRAW BLUE   RAINBOW DRAW GOLD   RAINBOW DRAW LAVENDER TALL (BNP)   RAINBOW DRAW DARK GREEN   URINE CULTURE, ROUTINE       MDM     Recur

## 2017-01-01 NOTE — ED INITIAL ASSESSMENT (HPI)
Pt c/o pressure when urinating and bright red blood when urinating. Denies abd or flank pain. Denies n/v/d/fevers.

## 2017-01-02 NOTE — ED NOTES
Pt to ER with c/o fever, chills, hematuria and dysuria. States she has had several UTI's in past few months and \"I just need an antibiotic. \"  Skin is warm to touch and pt is irritable and states, \"I'm just uncomfortable. \"   at bedside.

## 2017-01-11 NOTE — PROGRESS NOTES
Ema King is a 73year old female. HPI:   Patient presents with:  UTI  Urinary Frequency      73 year female with recurrent UTI history last seen in the office 12/6/2016.   Over the last 3-4 months she has had recurrent urinary tract infections wit Vitamins-Minerals (MULTI COMPLETE OR) Take by mouth.  Disp:  Rfl:    Lisinopril-Hydrochlorothiazide 10-12.5 MG Oral Tab  Disp:  Rfl: 0   simvastatin (ZOCOR) 20 MG Oral Tab  Disp:  Rfl: 0       Allergies:    Penicillins             Swelling  Sulfamide [Sulfa

## 2017-01-23 NOTE — TELEPHONE ENCOUNTER
Spoke with pt and determined that she thinks she saw what loks like a blood clot in the toilet. States that it was dark brown red piece of matter. States that she was concerned and came up to the office.  I spoke with DULCE to inform and gave verbal orders fo

## 2017-01-23 NOTE — TELEPHONE ENCOUNTER
pt called. She states another UTI is coming on. LOV 1/11/17. She finished her antibiotics. Please advise.

## 2017-01-23 NOTE — TELEPHONE ENCOUNTER
I spoke with pt and determined that she has severe urinary frequency and had blood in her urine from 1 pm to 9 pm yesterday and then it stopped and then this morning it started again.  She states that she is taking the daily suppressive abx along with the p

## 2017-01-26 NOTE — TELEPHONE ENCOUNTER
I spoke with pt and she states that she is feeling much better since she started the Cipro  And is wondering if the urine culture she submitted on 1/23 is showing any bacterial growth.  I told her that it is showing <10,000 CFU/ML of Staphylococcus species

## 2017-01-26 NOTE — TELEPHONE ENCOUNTER
Patient is calling to give RN update on how she is feeling. States she is feeling much better. Also states that she had a Urine culture done on 01/23 and if results show anything to call her with results.  Please advise thank you

## 2017-02-08 PROBLEM — M54.50 LUMBAGO: Status: ACTIVE | Noted: 2017-01-01

## 2017-02-08 NOTE — PROGRESS NOTES
2/8/17 Pt presents to [facility] ambulatory for follow up for her chronic mid low back pain. Pt seen last in May and had LESI in June. Pt reports fantastic results that has lasted until recently. Pt denies changes in her pain symptoms.  Pt reports the tramadol g

## 2017-02-08 NOTE — CHRONIC PAIN
Follow-up Note    HISTORY OF PRESENT ILLNESS:  Nata Eldridge is a 73year old old female who presents for follow up of chronic low back pain. She reports last injection done in June 2016 provided her with \"excellent\" relief.  She reports that the pain g Comment 62       FAMILY HISTORY:  Family History   Problem Relation Age of Onset   • Heart Disease Father      Coronary artery disease       SOCIAL HISTORY:    Social History   Marital Status:   Spouse Name: N/A    Years of Education: N/A  Number of

## 2017-03-01 NOTE — PROGRESS NOTES
Jenna Almeida is a 78year old female. HPI:   Patient presents with:  UTI  Urinary Frequency  Hematuria: Onset 1 day      78year old female here for followup to visit 1/11/2016.   She was seen for multiple  issues including recurrent culture proven [Sulfacet*    Swelling      ROS:       PHYSICAL EXAM:        ASSESSMENT/PLAN:   Assessment  Urine finding  Gross hematuria  Acute cystitis without hematuria  Urine frequency  (primary encounter diagnosis)    Reviewed findings with her.   Recommended a trial

## 2017-03-01 NOTE — TELEPHONE ENCOUNTER
Patient last visit was on 2/28/17 and at that time you Rx patient Myrbetriq. Received fax from pharmacy stating that her plan will not cover medication. Pharmacy requesting recommendation for alternative medication. Please advise. Thank you.

## 2017-03-03 NOTE — TELEPHONE ENCOUNTER
Patient is anxious to speak with a RN regarding medication. States that she can not hold her urine in and  she would like to know if Dr can prescribe something else for her please advise.  -- Let her know spoke with RN and she will give her a call back prisca

## 2017-03-03 NOTE — TELEPHONE ENCOUNTER
C/ Pascual De Graham [address] [phone] to initiate Prior Auth. Prior Randene Lesser is in progress and will take 72 hours for determination. Patient contacted and is aware.

## 2017-03-03 NOTE — TELEPHONE ENCOUNTER
Staff please call the patient and inform her that her insurance company has denied coverage of this medication. I had tried her previously on other anticholinergics but she did not tolerate them because of significant mouth dryness.   See what she'd like t

## 2017-03-06 NOTE — TELEPHONE ENCOUNTER
Received letter from patient's [facility] [employer] stating Myrbetriq 25 mg has been approved 1/1/17-3/6/18. Letter sent to scan. Left message for patient.

## 2017-03-07 NOTE — TELEPHONE ENCOUNTER
Patient's last office visit; 2/28/17; for urinary frequency. Please see 3/1/17 TE prior auth. Spoke to patient. Informed patient prior Shahana [place] was approved for myrbetriq 25 mg daily.  Advised patient, per last office visit note/plan, to try medication, f/u i

## 2017-03-30 NOTE — TELEPHONE ENCOUNTER
Patient has an appointment on 04/06 with Dr Sofie Rodriguez. States she is experiencing blood in her urine again. Its bright red. Saw her PCP who prescribed her Antibiotics which she is taking. But would also like to know what else to do until next appt.  States PCP

## 2017-03-30 NOTE — TELEPHONE ENCOUNTER
Returned pt's call and spoke with her. She states she is being treated with Nitrofurantoin, by her PCP for uti, and just wants to make sure she is doing the right thing.  She describes an episode of bright red blood in her urine,when she call her pcp, and w

## 2017-04-03 NOTE — TELEPHONE ENCOUNTER
I spoke with pt and informed her of DULCE's msg and instructions below and she informed that her PCP did prescribe an abx and she is currently taking them I told her that she should stop them unless it is the same ( Jaimee Niece) med and it should be taken rasheeda

## 2017-04-03 NOTE — TELEPHONE ENCOUNTER
Staff call her and inform her urine culture is showing candida albicans, a yeast species. She likely has this because she has been on multiple courses of antibiotics recently for UTI.   I would advise she take Diflucan 200 mg PO QD x 14 days and placed the

## 2017-04-07 NOTE — PROGRESS NOTES
Chepe Matias is a 78year old female.     HPI:   Patient presents with:  UTI: blood in urine, antibiotic treatment      41-year-old female presents in follow-up to a previous visits February 28, 2017 with multiple urologic issues including recurrent cult 0.0 oz/week       0 Standard drinks or equivalent per week       Comment: Occasionally       Medications (Active prior to today's visit):    Current Outpatient Prescriptions:  cephALEXin 250 MG Oral Cap  Disp:  Rfl: 3   fluconazole 100 MG Oral Tab TK 1 T P

## 2017-04-10 NOTE — TELEPHONE ENCOUNTER
Stay on Diflucan. Drop off another urine sample when she can. She should followup with Dr. Lorraine Dickson as we had discussed at her last visit. Call us tomorrow with update on how urine looks.

## 2017-04-10 NOTE — TELEPHONE ENCOUNTER
Returned pt's call and spoke with her (please note lov 4/06/17) . Pt currently being treated with Diflucan for candida albicans in urine. States last yesterday am, began having blood in urine. States by 2 pm yesterday, it was gone.  States it re-occurred th

## 2017-04-10 NOTE — TELEPHONE ENCOUNTER
I spoke with pt and informed her of [facility]'s orders and instructions and told her that there is an open urine culture order in the system dated 4/3 that she can use and She said that she will call tomorrow to inform on her urine.

## 2017-04-11 NOTE — TELEPHONE ENCOUNTER
Pt states she was referred, by Dr. Theresa Kong, to see Dr. Beltran/Gynecology and is requesting a referral w/Dx information to be faxed to PCP/Dr. Adrian Varghese to fax: [phone]. Pt states that she unable to make appt w/o the referral and dx information.  Please

## 2017-04-11 NOTE — TELEPHONE ENCOUNTER
pt called. She was advised to call if she was still having symptoms. States she still gets blood in her urine,  but only in the morning. No other symptoms. Please advise.

## 2017-04-12 NOTE — TELEPHONE ENCOUNTER
Lets have her avoid any exercise for 3-4 days for now and call early next week with an update. If hematuria worsens in meantime, she should call us back. Confirm that she is not on any blood thinners (I dont see any thinners  mentioned in chart for her).

## 2017-04-12 NOTE — TELEPHONE ENCOUNTER
Returned pt's call and spoke with her. Read to her 's reply as outlined below in this encounter, in it's entirety. Pt sates she is not on blood thinners.  States she continued to have blood in her urine all night, and \"continued to bleed\" this am. D

## 2017-04-19 NOTE — TELEPHONE ENCOUNTER
Spoke with pt and determined that she statred seeing light red blood in her urine this morning at 10 am and has seen it 2 times so far.  Denies pain or burning with urination, increase in urinary frequency or urgency,  foul smell to the urine, or fever and

## 2017-04-19 NOTE — TELEPHONE ENCOUNTER
Phoned pt and spoke with her. Read to her 's note as outlined below in this encounter, in it's entirety. Pt verbalized understanding, agrees to plan, and is thankful. She states the blood in urine has cleared up.

## 2017-04-19 NOTE — TELEPHONE ENCOUNTER
Lets start her on low dose suppressive antibiotics. I will start her on macrobid 100 mg PO QD for now. Please have her take Probiotics or Yogurt daily as well. Should continue this for 6 weeks and followup with me at that time to see how she is doing.

## 2017-04-28 NOTE — TELEPHONE ENCOUNTER
Patient contacted. Please see 4/12 urine culture results, and 4/12 urine cytology; (copied and pasted below). Patient is aware of her test results and verbalized understanding.   Patient states she wants Dr. Anna Marie Abdul to be aware that she saw Dr. Denisha Moss yest

## 2017-05-09 NOTE — TELEPHONE ENCOUNTER
DULCE called to inform that this pt had an MRI ordered by Dr. Sanjiv Rodriguez (Flor Johnson) because of pt's persistent gross hematuria and c/o painful urination.  The MRI is showing a mass in the bladder at the lumen and he wants to discuss this with the pt in the offic

## 2017-05-09 NOTE — PROGRESS NOTES
Baldemar Olguin is a 78year old female. HPI:   Patient presents with: Other:  mass    60-year-old female most recently in the office April 7, 2017. The patient has a previous history of overactive bladder symptoms.   In October 2016 she began having i Status: Former Smoker                   Packs/Day: 0.00  Years:           Types: Cigarettes      Quit date: 01/01/2015    Alcohol Use: Yes           0.0 oz/week       0 Standard drinks or equivalent per week       Comment: Occasionally       Medications (A and pelvis January 2017 which was unremarkable, urine cytology performed April 2017 which did not demonstrate any malignant cells.   I told the patient that this could be a non-urothelial-based tumor possibly a sarcoma, squamous cell cancer or an adenocarci

## 2017-05-10 NOTE — TELEPHONE ENCOUNTER
I called [facility] again and spoke with Una Hay and she said that she is working on this referral and she will change the status to urgent but cant do much else as far as getting it approved.  She informed that Dr. Neville Tian is in [facility] the [id number]

## 2017-05-10 NOTE — TELEPHONE ENCOUNTER
I called and spoke with Dr. Stella Lao, uro-oncologist at [facility]. Explained case to him and he agrees to see the patient. I called and spoke with the patient and she is aware.   She will come to radiology and will obtain her CT scans x 2 from January and Scott

## 2017-05-10 NOTE — TELEPHONE ENCOUNTER
I spoke with Dr. Maribell Wylie who will ok the referral.  Staff please call the [facility] [employer] employee that you spoke with earlier and inform them that Dr. Maribell Wylie aware and agrees with referral.

## 2017-05-10 NOTE — TELEPHONE ENCOUNTER
I called managed care again and spoke with Melida and informed her that I spoke with Rico Mobley earlier this morning and I wanted to inform her that [facility] [place] spoke with Dr. Maribell Wylie and he said that he would approve the referral today and I am calling to see if it i

## 2017-05-10 NOTE — TELEPHONE ENCOUNTER
Make sure patient aware that referral if is progress and that she should not hold up her appointments. It will go thrugh since I verbally spoke with Dr. Esha Delatorre.

## 2017-05-10 NOTE — TELEPHONE ENCOUNTER
Pt states she is waiting for a cb from the office re: an appt with Dr. Charly Johnson asking for RN to call [phone], states it is her husbands cell phone number and she will not be available at her home phone. Thank you.

## 2017-05-11 NOTE — TELEPHONE ENCOUNTER
I tried to reach pt at the home # and her spouse told me she was at work and that they're out to lunch till a little after 1pm and to call later.

## 2017-05-11 NOTE — TELEPHONE ENCOUNTER
I spoke with pt and told her that we are still waiting for the managed care to approve and the referral is still showing as pending but I did tell her that [facility] spoke with Dr. Maribell Wylie who is in charge of approving the exceptions and he told him that he will a

## 2017-05-12 NOTE — TELEPHONE ENCOUNTER
I LMTCB with [facility] to find out if they have any idea when the referral to Dr. Pito Bains will be approved because pt has been calling and is very anxious to start [address] [place] [address] 153.

## 2017-05-12 NOTE — TELEPHONE ENCOUNTER
Patient requesting records of office visits and any diagnotic results that DULCE ordered to be faxed to Dr Sch[facility]er [facility] office. Fax # [phone]. Please send. Thank you.

## 2017-05-16 NOTE — TELEPHONE ENCOUNTER
I spoke with pt and told her that I had been trying to fax her records to Dr. Lucinda Tate office with the fax # she provided and it failed several times and so I faxed it to the fax # that Ceasar Kurtz, Dr. Carloz Avalos nurse had given me in the apst for other pt's and it jake

## 2017-05-19 NOTE — TELEPHONE ENCOUNTER
I finally faxed the records to the fax # that I had for Dr. Madai Dempsey and I got a fax confirmation. I called pt to inform her of this and she was grateful and said that she booked an appt already also.

## 2017-06-02 NOTE — ED NOTES
Pt. Complains of chronic knee and ankle pain along with nausea and vomiting since Monday. Pt says she is having a bladder biopsy at [facility] next Thursday.

## 2017-06-02 NOTE — ED PROVIDER NOTES
Patient Seen in: [facility] AND [facility] Emergency Department    History   Patient presents with:  Nausea/Vomiting/Diarrhea (gastrointestinal)    Stated Complaint: \"I haven't eaten since Monday\"    HPI    78-year-old female patient presents her complaining o (ZOCOR) 20 MG Oral Tab,         Family History   Problem Relation Age of Onset   • Heart Disease Father      Coronary artery disease         Smoking Status: Former Smoker                   Packs/Day: 0.00  Years:           Types: Cigarettes      Quit date: URINALYSIS WITH CULTURE REFLEX - Abnormal; Notable for the following:     Urine Color Red (*)     Clarity Urine Cloudy (*)     Protein Urine 100  (*)     Ketones Urine 20  (*)     Blood Urine Large (*)     Urobilinogen Urine 2.0 (*)     Leukocyte Esteras needed for Pain., Script printed, Disp-20 tablet, R-0    Ondansetron HCl (ZOFRAN) 4 mg tablet  Take 1 tablet (4 mg total) by mouth every 8 (eight) hours as needed for Nausea., Script printed, Disp-10 tablet, R-0    Ciprofloxacin HCl 250 MG Oral Tab  Take 1

## 2017-06-02 NOTE — ED NOTES
Ambulated to BR verbalized concern with urgency and inability to void, soon after spontaneous void. Post void residual 88ml. Discharged home into care of son with plan to follow up with PCP as indicated. Alert and interactive. Hemodynamically stable.  Agree

## 2017-06-02 NOTE — ED NOTES
Patient was reexamined at 6:20 PM.  She is tolerating p.o. fluids well with no recurrent nausea. Patient requests discharge. The patient understands the need to push p.o. fluids.

## 2017-06-02 NOTE — ED NOTES
Patient was reexamined at 5:50 PM.  After 2 L of fluid, patient's heart rate is still approximately 100. On EKG, patient's rhythm is sinus. Patient states she is still having some mild pain in her left foot.   Patient's urine result was also discussed wit

## 2017-06-02 NOTE — ED INITIAL ASSESSMENT (HPI)
Pt sts that she has not eating since Monday, sts unable to keep anything down since that time. Constant nausea and dry heaves. Denies any pain.

## 2017-06-04 PROBLEM — N39.0 URINARY TRACT INFECTION WITH HEMATURIA, SITE UNSPECIFIED: Status: ACTIVE | Noted: 2017-01-01

## 2017-06-04 PROBLEM — R31.9 URINARY TRACT INFECTION WITH HEMATURIA, SITE UNSPECIFIED: Status: ACTIVE | Noted: 2017-01-01

## 2017-06-04 PROBLEM — N32.89 BLADDER MASS: Status: ACTIVE | Noted: 2017-01-01

## 2017-06-04 PROBLEM — R11.2 NAUSEA AND VOMITING, INTRACTABILITY OF VOMITING NOT SPECIFIED, UNSPECIFIED VOMITING TYPE: Status: ACTIVE | Noted: 2017-01-01

## 2017-06-04 PROBLEM — E86.0 DEHYDRATION: Status: ACTIVE | Noted: 2017-01-01

## 2017-06-04 NOTE — CONSULTS
..   [facility]  TEL: [phone]  FAX: [phone]    Lavinia Wong Patient Status:  Inpatient    1950 MRN [id number]   Location [facility] 5SW/SE Attending Wendy Sepulveda MD   Hosp Day # 0 JOSE RAUL ABDALLA Surgical History    KNEE REPLACEMENT SURGERY  2008    HYSTERECTOMY  2003    Comment 63    TOTAL KNEE REPLACEMENT       Family History   Problem Relation Age of Onset   • Heart Disease Father      Coronary artery disease      reports that she quit smoking a 06/04/2017   CREATSERUM 1.76 06/04/2017   BUN 42 06/04/2017    06/04/2017   K 4.1 06/04/2017   CL 99 06/04/2017   CO2 17 06/04/2017    06/04/2017   CA 9.5 06/04/2017   ALB 3.0 06/04/2017   ALKPHO 195 06/04/2017   BILT 3.4 06/04/2017   TP 6.7 0

## 2017-06-04 NOTE — ED INITIAL ASSESSMENT (HPI)
Pt presents to Ed with N&V seen her last Friday for same issues and t=returns today not feeling any better

## 2017-06-04 NOTE — PROGRESS NOTES
[facility] Dosing Service  Antibiotic Dosing    Norfolk Sharif is a 78year old female for whom pharmacy is dosing fluconazole for treatment of  UTI and r/o sepsis. . .   Other antibiotics (Not dosed by pharmacy): Rocephin 1 gm Q 24 hr.    Allergies: is a

## 2017-06-04 NOTE — PROGRESS NOTES
Patient  will bring in updated medication list. Patient does not know her home medications off hand.

## 2017-06-04 NOTE — ED NOTES
Pt to ed from home with c/o N and urinary urgency and frequency   Pt has hx of uti on meds   Iv infusing and meds given

## 2017-06-04 NOTE — PROGRESS NOTES
Admitted patient to room 533. A/o x4 and  is here bedside. Patient has complaints of lower back pain and c/o of nausea. Patient is asking to drink water. Vitals taken and are WNL. MD has been notified for admission, orders received.

## 2017-06-04 NOTE — CONSULTS
[facility]    Report of Consultation    Radha Corado Patient Status:  Inpatient    1950 MRN [id number]   Location [facility] 5SW/SE Attending Edilberto Smith MD   Hosp Day # 0 PCP Kev Rand MD     Date of Admission:  6 Patient has had total hysterectomy because of urinary incontinence in the past as well as for cystocele; patient states that problem continued after the hysterectomy.     For more detailed past urology history, please see \"chart review\" below      HIS nasal drainage  Eyes:  Negative for eye discharge and vision loss  Hema/Lymph:  Negative for  easy bruising  Integumentary:  Negative for pruritus and rash  Musculoskeletal: Positive for chronic back pain syndrome for which she has been attending pain clin neck--present  Leakage of urine with coughing in dorsal lithotomy--none  Uterus and cervix--surgically absent  Adnexa--nonpalpable and likely surgically absent  Perineum and anus unremarkable in appearance  Skin/Hair: no unusual rashes present no abnormal 1052* 4315* 95621*   KAREN Few* Few* Negative  --   --  Moderate* Negative Many*     6/4/17 AST elevated 312; ALT elevated 125; alkaline phosphatase elevated 195; total bilirubin elevated 3.4; direct bilirubin elevated 1.8    6/2/17 urine culture less binh BLADDER: Again seen is infiltrative, lobulated bladder soft tissue, compatible with tumor. The bladder mass and adjacent lymphadenopathy now appear confluent and measure, in aggregate, 4.9 x 7.6 cm (series 2, image 120).   Additionally, there is hyperdensit measures 2.4 x 1.6 cm (series 2, image 117). PELVIC ORGANS: Postoperative changes of hysterectomy are noted. Infiltrative tumor masses are present in the pelvis.  VASCULATURE:   Moderate atherosclerotic vascular calcifications of the abdominal aorta are obs above.        5/5/17 MRI pelvis with and without contrast = large bladder infiltrative intraluminal mass involving left lateral bladder wall and extending inferiorly. ..  Lobulated nodules along external left lateral bladder wall may represent extraluminal e for a mass palpated at the bladder neck and she was referred for an MRI of the pelvis with and without contrast May 5, 2017 showing showing a large infiltrative bladder intraluminal mass involving the left lateral bladder wall and extending inferiorly, lob of bladder masses at [facility] in 4 days; patient came to [address] [place] [address] emergency room because of nausea and vomiting    RECOMMENDATIONS AND TREATMENT PLAN      1.  IV fluids and IV hydration  2. Zofran for nausea and vomiting  3. Await results of urine culture  4.

## 2017-06-04 NOTE — ED PROVIDER NOTES
Patient Seen in: [facility] AND [facility] Emergency Department    History   Patient presents with:  Nausea/Vomiting/Diarrhea (gastrointestinal)    Stated Complaint: Nausea;  Constipation    HPI    Patient is a 44-year-old female who complains of nausea and vomit Relation Age of Onset   • Heart Disease Father      Coronary artery disease         Smoking Status: Former Smoker                   Packs/Day: 0.00  Years:           Types: Cigarettes      Quit date: 01/01/2015    Alcohol Use: Yes           0.0 oz/week reflexes. No cranial nerve deficit. No motor os sensory defecits noted Coordination normal.   Skin: Skin is warm and dry. Psychiatric: Normal mood and affect. Behavior is normal. Judgment and thought content normal.   Nursing note and vitals reviewed. WITH DIFFERENTIAL WITH PLATELET.   Procedure                               Abnormality         Status                     ---------                               -----------         ------                     CBC W/ DIFFERENTIAL[[id number]]          Abnormal

## 2017-06-04 NOTE — H&P
[address] [facility] Patient Status:  Inpatient    1950 MRN [id number]   Location [facility] 5SW/SE Attending Lien Jones MD   Hosp Day # 0 PCP Sonny Fairbanks MD     Date:  2017  Date of Nausea. Disp: 10 tablet Rfl: 0    Ciprofloxacin HCl 250 MG Oral Tab Take 1 tablet (250 mg total) by mouth 2 (two) times daily.  Disp: 14 tablet Rfl: 0    TraMADol HCl 50 MG Oral Tab Take 1 tablet (50 mg total) by mouth every 8 (eight) hours as needed for Pa Data:     Lab Results  Component Value Date   WBC 16.4 06/04/2017   HGB 12.0 06/04/2017   HCT 36.3 06/04/2017    06/04/2017   CREATSERUM 1.76 06/04/2017   BUN 42 06/04/2017    06/04/2017   K 4.1 06/04/2017   CL 99 06/04/2017   CO2 17 06/04/201 obstructive uropathy.      6. There is a 0.6 cm pleural-based nodular focus at the lateral aspect of the right middle lobe, incompletely imaged.      7. Distal colonic diverticulosis without CT evidence of acute completion.      8.  Status post hysterectomy

## 2017-06-05 PROBLEM — N39.0 URINARY TRACT INFECTION WITH HEMATURIA, SITE UNSPECIFIED: Status: RESOLVED | Noted: 2017-01-01 | Resolved: 2017-01-01

## 2017-06-05 PROBLEM — R31.9 URINARY TRACT INFECTION WITH HEMATURIA, SITE UNSPECIFIED: Status: RESOLVED | Noted: 2017-01-01 | Resolved: 2017-01-01

## 2017-06-05 PROBLEM — R31.9 URINARY TRACT INFECTION WITH HEMATURIA, SITE UNSPECIFIED: Chronic | Status: RESOLVED | Noted: 2017-01-01 | Resolved: 2017-01-01

## 2017-06-05 PROBLEM — N39.0 URINARY TRACT INFECTION WITH HEMATURIA, SITE UNSPECIFIED: Chronic | Status: RESOLVED | Noted: 2017-01-01 | Resolved: 2017-01-01

## 2017-06-05 PROBLEM — R79.89 ELEVATED LFTS: Status: ACTIVE | Noted: 2017-01-01

## 2017-06-05 PROBLEM — R11.2 NAUSEA AND VOMITING, INTRACTABILITY OF VOMITING NOT SPECIFIED, UNSPECIFIED VOMITING TYPE: Status: RESOLVED | Noted: 2017-01-01 | Resolved: 2017-01-01

## 2017-06-05 PROBLEM — N32.89 BLADDER MASS: Chronic | Status: ACTIVE | Noted: 2017-01-01

## 2017-06-05 PROBLEM — R59.0 LYMPHADENOPATHY, INGUINAL: Status: ACTIVE | Noted: 2017-01-01

## 2017-06-05 PROBLEM — I48.91 ATRIAL FIBRILLATION WITH RAPID VENTRICULAR RESPONSE (HCC): Status: ACTIVE | Noted: 2017-01-01

## 2017-06-05 NOTE — PROGRESS NOTES
Pharmacy was consulted to dose prophylactic heparin by Dr. Max Gaytan.    Per Leeann Aguilar (RN) patient is experiencing blood in urine, which prompted the switch from therapeutic heparin to prophylactic heparin. Heparin 5000 units subq q12h was ordered.      Thank

## 2017-06-05 NOTE — PLAN OF CARE
Problem: Patient/Family Goals  Goal: Patient/Family Long Term Goal  Patient’s Long Term Goal: To be discharged to home    Interventions:  Monitor labs  VS  Admin meds as ordered  - See additional Care Plan goals for specific interventions   Outcome: Progre relaxation techniques  - Monitor for opioid side effects  - Notify MD/LIP if interventions unsuccessful or patient reports new pain  Outcome: Progressing    Problem: SAFETY ADULT - FALL  Goal: Free from fall injury  INTERVENTIONS:  - Assess pt frequently f

## 2017-06-05 NOTE — PROGRESS NOTES
[facility]    Progress Note    Katelin Baer Patient Status:  Inpatient    1950 MRN [id number]   Location [facility] 5SW/SE Attending Ghanshyam Cassidy MD   Hosp Day # 1 PCP Sonny Fairbanks MD       Interval History:   Lethargic MCV  86.8  88.4  89.9   MCH  29.6  29.3  29.9   MCHC  34.1  33.1  33.2   RDW  14.2  14.6  14.9   WBC  10.9  16.4*  16.8*   PLT  127*  157  146     Recent Labs   Lab  06/02/17   1446  06/04/17   1111  06/05/17   0755   GLU  95  124*  176*   BUN  32*  42* of left inguinal lymphadenopathy, highly suspicious for tumor involvement. 4. There has been substantial interval enlargement of the liver. There are heterogeneous, nodular contours. This could reflect tumor infiltration, but is not specific.   5. No evide

## 2017-06-05 NOTE — PROGRESS NOTES
INFECTIOUS DISEASE PROGRESS NOTE    Leonidas Wong Patient Status:  Inpatient    1950 MRN [id number]   Location [facility] 5SW/SE Attending Cathy Cortes MD   Hosp Day # 1 PCP Dolph Hammans, MD     Subjective:  Patient seen and examined, no the bloodstream with patient with infiltrating bladder mass. # Penicillin allergy.  This was as a child.  She is tolerated \"cousins\" of penicillin in the past without any issues. No known history of MDRO from any urine or blood pathogens.   # Leukocytos

## 2017-06-05 NOTE — CONSULTS
Pulmonary/Critical Care Consult Note    HPI:   Austin Hernandez is a 78year old female with Patient presents with:  Nausea/Vomiting/Diarrhea (gastrointestinal)    Jose Moss MD    Pt is a 77 yo with h/o HTN, HL, chronic back pain and anxiety who was adm Daily   [START ON 6/6/2017] multivitamin (ADULT) tab 1 tablet 1 tablet Oral Daily   Ondansetron HCl (ZOFRAN) tab 4 mg 4 mg Oral Q8H PRN   [DISCONTINUED] atorvastatin (LIPITOR) tab 10 mg 10 mg Oral Nightly   HYDROmorphone HCl PF (DILAUDID) 1 MG/ML injection [COMPLETED] HYDROmorphone HCl PF (DILAUDID) 1 MG/ML injection 0.5 mg 0.5 mg Intravenous Once   [COMPLETED] Sodium Chloride 0.9 % solution      ondansetron HCl (ZOFRAN) injection 4 mg 4 mg Intravenous Q4H PRN   HYDROcodone-acetaminophen (NORCO) 5-325 MG p 95%    Intake/Output Summary (Last 24 hours) at 06/05/17 1609  Last data filed at 06/05/17 1300   Gross per 24 hour   Intake    913 ml   Output    200 ml   Net    713 ml     Looks uncomfortable  No resp distress  A&Ox3 but some difficulty  Head AT/NC  Anic

## 2017-06-05 NOTE — PROGRESS NOTES
[facility] Pharmacy Note:  Renal Dose Adjustment for Cetirizine (ZYRTEC)    Xavi Wong has been prescribed Cetirizine (Zyrtec) 10 mg orally daily prn. Estimated Creatinine Clearance: 15.3 mL/min (based on Cr of 2.87).     Her calculated creatinine clearanc

## 2017-06-05 NOTE — CONSULTS
[facility] HOSP - [facility]    Report of Consultation    Nata Eldridge Patient Status:  Inpatient    1950 MRN [id number]   Location [facility] 5SW/SE Attending Amarjit Guo MD   Hosp Day # 0 PCP Ahmet Ohara MD     Date of Admission:  6 HYDROmorphone HCl PF (DILAUDID) 1 MG/ML injection 0.5 mg 0.5 mg Intravenous Q4H PRN   Sodium Chloride 0.9 % solution      HYDROmorphone HCl PF (DILAUDID) 1 MG/ML injection 0.5 mg 0.5 mg Intravenous Q2H PRN       Prescriptions prior to admission:  Dominik Stringer hydromorphone  Head normocephalic atraumatic  Pupils equal round reactive, EOM intact, + jaundice  Lungs clear to auscultation and percussion   S1 S2 regular   BS active, abdomen non-tender, no masses appreciated   Adenopathy - palpable left inguinal node bladder. Alternatively, this could represent intraluminal debris. 3. Interval development of left inguinal lymphadenopathy, highly suspicious for tumor involvement. 4. There has been substantial interval enlargement of the liver.  There are heterogeneous,

## 2017-06-05 NOTE — PROGRESS NOTES
Rapid Response Note    A rapid response was called 730 this morning for patient going into rapid Afib with RVR. HR sustaining in the 140's. Patient was very anxious and up all night last night. Dr. Celia Monson came into the room as well.  EKG revealed rapid afib

## 2017-06-05 NOTE — CONSULTS
[facility]    Report of Consultation    Sedrick Lubin Patient Status:  Inpatient    1950 MRN [id number]   Location [facility] 5SW/SE Attending Madhuri Jaimes MD   Hosp Day # 1 PCP Katherine Rudolph MD     Date of Admission:   (Porcine) 1000 UNIT/ML injection 4,900 Units 60 Units/kg Intravenous Once   heparin (PORCINE) 88080toqwv/250mL infusion INITIAL DOSE 12 Units/kg/hr Intravenous Once   heparin (PORCINE) drip 79649nazto/250mL infusion CONTINUOUS 200-3,000 Units/hr Intravenou /71 mmHg  Pulse 105  Temp(Src) 97.6 °F (36.4 °C) (Oral)  Resp 20  Ht 5' 2\" (1.575 m)  Wt 180 lb (81.647 kg)  BMI 32.91 kg/m2  SpO2 91%     Intake/Output Summary (Last 24 hours) at 06/05/17 1156  Last data filed at 06/05/17 0547   Gross per 24 hour Kevin Hawley 2d Rndr(no Iv,no Oral)(cpt=74176)    6/4/2017  CONCLUSION:  1. 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  There has been increase in size and n

## 2017-06-05 NOTE — PLAN OF CARE
Problem: Patient Centered Care  Goal: Patient preferences are identified and integrated in the patient’s plan of care  Interventions:  - What would you like us to know as we care for you? Can’t get comfortable.   - Provide timely, complete, and accurate inf patient safety including physical limitations  - Instruct pt to call for assistance with activity based on assessment  - Modify environment to reduce risk of injury  - Provide assistive devices as appropriate  - Consider OT/PT consult to assist with streng

## 2017-06-05 NOTE — PROGRESS NOTES
[facility]    Progress Note    Dago Soto Patient Status:  Inpatient    1950 MRN [id number]   Location [facility] 5SW/SE Attending Manisha Miramontes MD   Hosp Day # 1 PCP Cherelle Cline MD       Subjective:   Dago Soto bleeding since I doubt a complete enodscopic resection could be done. Additionally, I suspect tumor mostly at bladder neck/proximal urethra which could further complicate resection. I spoke with Taylor Landa from [address] [facility].   Images will be reviewed with MAIDA montgomery involving the bladder wall with substantial interval worsening of metastatic pelvic lymphadenopathy since 05/05/2017. There has been increase in size and number of pelvic nodes, particularly involving the left obturator and left external iliac chains.   2.

## 2017-06-05 NOTE — CONSULTS
[facility] NOTE  Cardiology Consultation    Andrew Wong Patient Status:  Inpatient    1950 MRN [id number]   Location [facility] 5SW/SE Attending Claudine Lake MD   Hosp Day # 1 PCP Alondra Henderson MD     Reason for Consult Patient's hemoglobin today is 11.9 white count both yesterday and today is 16.8 she has 15% bands the patient's BUN is gone from 42 yesterday to 63 today creatinine 1.7 62.87 potassium 5.7 magnesium 2.4.   For right now the patient is getting normal saline problem    • Anxiety state          Past Surgical History    KNEE REPLACEMENT SURGERY  2008    HYSTERECTOMY  2003    Comment 63    TOTAL KNEE REPLACEMENT       Family History   Problem Relation Age of Onset   • Heart Disease Father      Coronary artery dis COMPLETE OR) Take by mouth.  Disp:  Rfl:  Unknown at Unknown time       Current Hospital Medications:    Current facility-administered medications:   •  dextrose 5 % and 0.9 % NaCl infusion, , Intravenous, Continuous  •  digoxin (LANOXIN) 250 MCG/ML injecti auscultation  Cardiac exam no rubs gallops or murmurs  Abdomen; soft nontender normal bowel sounds  Pulses; full and symmetric  Extremities no edema  Neurologic oriented to person place and time no focal defects noted  Psychiatric given decreased mentation

## 2017-06-05 NOTE — PROGRESS NOTES
[address] [facility] dosing service    Initial Pharmacokinetic Consult for Vancomycin Dosing     Renetta Seymour is a 78year old female who is being treated for sepsis.   Pharmacy has been asked to dose Vancomycin by Dr. Gin Capps    She is allergic to penicillins Range   Urine Culture 10,000 - 50,000 CFU/ML Yeast species N/A           Based on the above:    1.  This patient will receive a loading dose of Vancomycin  1.5 gm IVPB (25mg/kg, capped at 2g) x 1 dose, followed by 1 gm q48 hours  based upon, adjusted weight

## 2017-06-05 NOTE — PROGRESS NOTES
[facility] Pharmacy Note:  Renal Adjustment for Fluconazole    Iftikhar Swift is a 78year old female who has been prescribed Fluconazole  400 mg every 24 hrs. CrCl is estimated creatinine clearance is 15.3 mL/min (based on Cr of 2.87).  so the dose has been adj

## 2017-06-06 PROBLEM — Z71.89 GOALS OF CARE, COUNSELING/DISCUSSION: Status: ACTIVE | Noted: 2017-01-01

## 2017-06-06 PROBLEM — D68.9 COAGULOPATHY (HCC): Status: ACTIVE | Noted: 2017-01-01

## 2017-06-06 PROBLEM — Z71.89 ADVANCED CARE PLANNING/COUNSELING DISCUSSION: Status: ACTIVE | Noted: 2017-01-01

## 2017-06-06 NOTE — BRIEF PROCEDURE NOTE
[facility]  Procedure Note    Garlon Failing Patient Status:  Inpatient    1950 MRN [id number]   Location [facility] Attending Belle Moura MD   Hosp Day # 2 PCP Tony Carrington MD     Procedure: Mady Roblero

## 2017-06-06 NOTE — PROGRESS NOTES
INFECTIOUS DISEASE PROGRESS NOTE    Christopher Louie Wong Patient Status:  Inpatient    1950 MRN [id number]   Location [facility] 5SW/SE Attending Marco A Jolley MD   Hosp Day # 2 PCP Alina Fields MD     Subjective:  Patient seen and examined, no ciprofloxacin at presentation.    # Urine culture from June 2 shows less than 10,000 yeast.  She has had Candida albicans in the past.  No MDRO  # Possible funguria.  No obstruction on CT scan.  Could potentially have translocation into the bloodstream wit

## 2017-06-06 NOTE — PLAN OF CARE
Problem: Patient/Family Goals  Goal: Patient/Family Long Term Goal  Patient’s Long Term Goal: To be discharged to home    Interventions:  Monitor labs  VS  Admin meds as ordered  - See additional Care Plan goals for specific interventions   Outcome: Not Pr needs  - Identify cognitive and physical deficits and behaviors that affect risk of falls.   - [place] fall precautions as indicated by assessment.  - Educate pt/family on patient safety including physical limitations  - Instruct pt to call for assistance

## 2017-06-06 NOTE — CONSULTS
Junior Wong Patient Status:  Inpatient    1950 MRN [id number]   Location [facility] 2W/SW Attending Harjinder Rivera MD   Hosp Day # 2 PCP Min Duval MD     Date of Consult: specific; no evidence of nephroureterolithiasis or obstructive uropathy; there is a 0.6 cm pleural-based nodular focus at the lateral aspect of the right middle lobe, incompletely imaged; distal colonic diverticulosis without CT evidence of acute completio Kaylee Baca, and Jany's  Troy Salomon. Prior to admission, Edda Santiago was completely independent with her ADL's and still working and driving a car.  She has been experiencing urinary problems since Thanksgiving time and was pursuing work-up on an outpatient bas support, but family is unsure that Davonte Liberty will be receptive. Family is hopeful that the biopsy can be done as soon as possible and I provided general information about the process.     With regard to advance care planning, family does not think that [address] injection 10 mL, 10 mL, Intravenous, PRN  •  dextrose 5 % and 0.9 % NaCl infusion, , Intravenous, Continuous  •  Fluconazole in Sodium Chloride (DIFLUCAN) IVPB premix 400 mg, 400 mg, Intravenous, Q48H  •  multivitamin (ADULT) tab 1 tablet, 1 tablet, Oral, 06/04/2017   TP 6.7 06/04/2017   * 06/04/2017   * 06/04/2017   MG 2.4 06/05/2017   PHOS 6.9* 06/06/2017       Imaging:  Ct Chest (cpt=71250)    6/4/2017  CONCLUSION:  1. A right lower lobe pleural-based nodule is noted.  Followup is recommende CONCLUSION:  1. No acute cardiopulmonary disease. 2. Minimal right perihilar and bibasilar atelectasis/scarring. Objective:  Vital Signs:  Blood pressure 108/59, pulse 123, temperature 96.9 °F (36.1 °C), temperature source Temporal, resp.  rate 27 counseling/discussion  -Full code; no limits set at this time.  -Continue supportive medical treatments.  -Provided emotional support to family who appears to be coping fairly.       Advanced care planning/counseling discussion  -Per family, there is no HCP

## 2017-06-06 NOTE — DIETARY NOTE
ADULT NUTRITION INITIAL ASSESSMENT    Pt is at moderate nutrition risk. Pt does not meet malnutrition criteria. RECOMMENDATIONS TO MD:   CPM. RD monitoring overall treatment plan to guide nutrition plan of care.       NUTRITION DIAGNOSIS/PROBLEM:  [facility]  ANTHROPOMETRICS:  HT: 157.5 cm (5' 2\")  WT: 81.647 kg (180 lb)   BMI: Body mass index is 32.91 kg/(m^2).   BMI CLASSIFICATION: 30-34.9 kg/m2 - obesity class I  IBW: 110 lbs        164 % IBW  Usual Body Wt: 180 lbs       100 % UBW    WEIGHT HISTORY: PRESCRIPTION:  Diet: general  Oral Supplements will initiate once pt able to tolerate po intake if appropriate.    Nutrition needs:   Calories: 1127-9256 calories/day (21-22 calories per kg or HBE x 1.2-1.3  Protein: 75  grams protein/day (1.5 grams protein

## 2017-06-06 NOTE — PROGRESS NOTES
[facility]    Progress Note      Assessment and Plan:     1. Bladder mass with associated marked pain.     Recommendations: Await needle aspirate of inguinal lymphadenopathy today, follow-up urology and oncology thereafter 1 specific histol INR 2.6 06/06/2017   PHOS 6.9 06/06/2017     Ultrasound of kidneys–no hydronephrosis    Echocardiography–diastolic dysfunction with normal EF 65%    I spoke at length with the  at the bedside.     Mandi Drake MD  Medical Director, Critical Ca

## 2017-06-06 NOTE — PROGRESS NOTES
06/06/17 1154   Clinical Encounter Type   Visited With Patient and family together   Routine Visit Introduction   Continue Visiting Yes   Crisis Visit Critical care   Referral From Nurse   Referral To [address] Needs Pr

## 2017-06-06 NOTE — PROGRESS NOTES
[facility]    Cardiology Progress Note    Radha Corado Patient Status:  Inpatient    1950 MRN [id number]   Location [facility] 2W/SW Attending Ousmane Lennon MD   Hosp Day # 2 PCP Kev Rand MD     Patient Active Proble Sodium (Porcine)  5,000 Units Subcutaneous 2 times per day   • piperacillin-tazobactam  3.375 g Intravenous Q12H   • [START ON 6/7/2017] vancomycin  1,000 mg Intravenous Q48H   • docusate sodium  100 mg Oral BID         Results:        Recent Labs   Lab  0 CONCLUSION:  1. A right lower lobe pleural-based nodule is noted. Followup is recommended in 3 months. 2. Scattered atelectasis without further evidence of acute intrathoracic process. 3. Minimal sequela of remote granulomatous disease. 4.  Low-density a -------------------------- Atrial fibrillation -Nonspecific ST depression -Nondiagnostic. ABNORMAL When compared with ECG of 06/02/2017 17:48:31 atrial fib has replaced sinus rhythm Electronically signed on 06/05/2017 at 09:27 by Emely Brar.  Mary Diaz

## 2017-06-06 NOTE — PROGRESS NOTES
[facility]    Progress Note    Esvin Failing Patient Status:  Inpatient    1950 MRN [id number]   Location [facility] 2W/SW Attending Belle Moura MD   Hosp Day # 2 PCP Tony Carrington MD       Subjective:   Esvin Failing larger bladder lesions and more pelvic and inguinal adenopathy. The CT scan was done without contrast and the liver appears to be enlarged very highly suggestive of liver metastases.   Her liver function tests are significantly abnormal.  She will need a b Abdomen+pelvis Kidneystone 2d Rndr(no Iv,no Oral)(cpt=74176)    6/4/2017  CONCLUSION:  1. 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  There has been increas

## 2017-06-06 NOTE — PROGRESS NOTES
[facility]    Progress Note    Renetta Seymour Patient Status:  Inpatient    1950 MRN [id number]   Location [facility] 2W/SW Attending Soraida Altamirano MD   Hosp Day # 2 PCP Cate Sanford MD       Subjective:   Renetta Seymour --    CA  8.6  8.6  8.0*  7.7*   --    NA  138  138  136  137   --    K  5.7*  5.7*  5.2*   --   4.6   CL  104  104  104  105   --    CO2  18*  18*  15*  13*   --           Ct Chest (cpt=71250)    6/4/2017  CONCLUSION:  1.  A right lower lobe pleural-base Chest Ap Portable  (cpt=71010)    6/5/2017  CONCLUSION:  1. No acute cardiopulmonary disease. 2. Minimal right perihilar and bibasilar atelectasis/scarring.                Crispin White MD  6/6/2017

## 2017-06-06 NOTE — PROGRESS NOTES
[facility]    Progress Note    Radha Corado Patient Status:  Inpatient    1950 MRN [id number]   Location [facility] 5SW/SE Attending Ousmane Lennon MD   Hosp Day # 2 PCP Kev Rand MD       Interval History:   Uncomfort 0522   GLU  176*  160*  160*  198*  147*   BUN  63*  69*  69*  71*  78*   CREATSERUM  2.87*  3.07*  3.07*  3.10*  3.79*   GFRAA  20*  18*  18*  18*  14*   GFRNAA  16*  15*  15*  15*  12*   CA  8.7  8.6  8.6  8.0*  7.7*   NA  136  138  138  136  137   K  5. There has been substantial interval enlargement of the liver. There are heterogeneous, nodular contours. This could reflect tumor infiltration, but is not specific. 5. No evidence of nephroureterolithiasis or obstructive uropathy.   6. There is a 0.6 cm pl

## 2017-06-07 PROBLEM — C67.9 BLADDER CANCER (HCC): Status: ACTIVE | Noted: 2017-01-01

## 2017-06-07 NOTE — PROGRESS NOTES
[address] [facility] dosing service    Follow-up Pharmacokinetic Consult for Vancomycin Dosing     Fadia Bernal is a 78year old female who is being treated for sepsis. Patient is on day 3 of Vancomycin 1.5 gm IV once. Goal trough is 15-20 ug/mL.     She is a Collection Time: 06/04/17 11:11 AM   Result Value Ref Range   Urine Culture 10,000 - 50,000 CFU/ML Yeast species N/A         Based on the above:    1.  One time dose Vancomycin at 750 mg IVPB once (based on Trough of 15.1 ug/mL, pharmacokinetics, 81.6kg w

## 2017-06-07 NOTE — PROGRESS NOTES
[facility]    Cardiology Progress Note    Ema King Patient Status:  Inpatient    1950 MRN [id number]   Location [facility] 2W/SW Attending Mena Fields MD   Hosp Day # 3 PCP Anila , MD     Patient Active Proble Intravenous See Admin Instructions (RX holding)   • vancomycin  750 mg Intravenous Once   • Metoclopramide HCl  5 mg Intravenous TID AC   • fluconazole  400 mg Intravenous Q48H   • multivitamin  1 tablet Oral Daily   • Heparin Sodium (Porcine)  5,000 Units 0700 - 06/07 0659 06/07 0700 - 06/08 0659    P. O.  70     I.V. (mL/kg) 1966 (24.1) 3280.3 (40.2)     Total Intake(mL/kg) 1966 (24.1) 3350.3 (41)     Urine (mL/kg/hr) 350 (0.2) 180 (0.1)     Stool 0 (0) 0 (0)     Total Output 350 180      Net +1616 +3170.3

## 2017-06-07 NOTE — PROGRESS NOTES
Pulmonary/Critical Care Follow Up Note    HPI:   Ema King is a 78year old female with Patient presents with:  Nausea/Vomiting/Diarrhea (gastrointestinal)      PCP Anila , MD  Admission Attending Ines Rivera 15 Day #3    + sob So (ZOSYN) 3.375 g in dextrose 5 % 100 mL IVPB, 3.375 g, Intravenous, Q12H  •  docusate sodium (COLACE) cap 100 mg, 100 mg, Oral, BID  •  bisacodyl (DULCOLAX) rectal suppository 10 mg, 10 mg, Rectal, Daily PRN  •  morphINE sulfate (PF) 2 MG/ML injection 2 or intubation  Plan follow closely   Family and pt discussing goals of care   Nebs    Afib/RVR  Regular now  Plan dilt/BB as needed    Liver failure  Presumed due to metastatic disease  Still hypoglycemic at times  Plan IVF with 10% glucose    Bladder mass

## 2017-06-07 NOTE — PLAN OF CARE
Problem: Patient/Family Goals  Goal: Patient/Family Long Term Goal  Patient’s Long Term Goal: To be discharged to home    Interventions:  Monitor labs  VS  Admin meds as ordered  - See additional Care Plan goals for specific interventions   Outcome: Not Pr light within reach, hourly rounding, tried dangling pt on side of bed this shift with no success. [place] intact. Pt turns self, pt kept clean and dry.      Problem: CARDIOVASCULAR - ADULT  Goal: Maintains optimal cardiac output and hemodynamic stability  INT

## 2017-06-07 NOTE — PROGRESS NOTES
06/07/17 1611   Clinical Encounter Type   Visited With Family   Routine Visit Follow-up   Continue Visiting Yes   Crisis Visit Patient actively dying   Referral From Nurse   Referral To    Family Spiritual Encounters   Family Coping Anger; Oval Finger

## 2017-06-07 NOTE — PROGRESS NOTES
[facility]    Progress Note    Tito Lane Patient Status:  Inpatient    1950 MRN [id number]   Location [facility] 2W/SW Attending Cris Martinez MD   Hosp Day # 2 PCP Chey Salvador MD      Tito Lane is a 78 year law, and . Results of testing reviewed. They are aware the biopsy result will not be available today. We will plan a family meeting to discuss the diagnosis and potential treatment once the pathology is available.       Results:     Lab Results  C

## 2017-06-07 NOTE — PROGRESS NOTES
[facility]    Progress Note    Araceli Peguero Patient Status:  Inpatient    1950 MRN [id number]   Location [facility] 2W/SW Attending Linda Oquendo MD   Hosp Day # 3 PCP Shayne Townsend MD       Subjective:   Araceli Peguero Coagulopathy (HCC)  Liver failure. Receiving FFP. Acute liver failure without hepatic coma  See above.         Results:     Lab Results  Component Value Date   WBC 6.4 06/07/2017   HGB 8.3* 06/07/2017   HCT 24.5* 06/07/2017   PLT 58* 06/07/2017   PLT

## 2017-06-07 NOTE — DISCHARGE PLANNING
JAMIE following up on d/c planning for the patient. Family is in agreement with hospice and per report, likely inpatient hospice appropriate. Referral placed to [facility].     Arya Browne [facility]  [id number]

## 2017-06-07 NOTE — HOSPICE RN NOTE
Assessed pt with numerous family at bedside. Comfort order set placed as per Dr. Edmundo Mann. Kept Dilaudid drip as was ordered by Dr. Susanna Ram. Currently at 2.5mg/hr. Pt appears calm and comfortable. She appears imminent.  Discussed case with America Maharaj, Dr. Rony Sparks

## 2017-06-07 NOTE — CONSULTS
[facility]  Palliative Care Follow Up    Samy Zamudio Patient Status:  Inpatient    1950 MRN [id number]   Location [facility] 2W/SW Attending Demarcus Neal MD   Hosp Day # 3 PCP Dayton Rojas MD     Date of Consult:  wants to talk with Dr. Leo Montoya prior to making the decision about comfort care/hospice. No Bryson was very tearful during my visit today. He was very remeniscint about his life with Sarah Perla and their family.  He and the family tell me that they are in shock by at 06/07/17 0600   Gross per 24 hour   Intake 3350.28 ml   Output    180 ml   Net 3170.28 ml       Physical Exam:  General: acutely ill, chronically ill, sleepy, not following commands and not answering ROS questions  Nutritional status: normally nourished 3.375 g in dextrose 5 % 100 mL IVPB, 3.375 g, Intravenous, Q12H  •  docusate sodium (COLACE) cap 100 mg, 100 mg, Oral, BID  •  bisacodyl (DULCOLAX) rectal suppository 10 mg, 10 mg, Rectal, Daily PRN  •  morphINE sulfate (PF) 2 MG/ML injection 2 mg, 2 mg, I DNR/DNI  Have advanced directives been discussed with patient or surrogate:  Yes  Advance Directive: Patient/family asked to bring in (Living Will)     Healthcare Agent Appointed: No        Pre-existing DNR/DNI Order: Yes, notify physician for order  Descri

## 2017-06-07 NOTE — PROGRESS NOTES
[facility]    Progress Note    Agnes Bear Patient Status:  Inpatient    1950 MRN [id number]   Location [facility] [place] [place] 2W/SW Attending Estuardo Ibrahim MD   Hosp Day # 3 PCP Shaka Thorpe MD       Subjective:   Agnes Bear 18*  14*   --   10*   GFRNAA  15*  12*   --   8*   CA  8.0*  7.7*   --   7.4*   NA  136  137   --   142   K  5.2*   --   4.6  5.5*   CL  104  105   --   108   CO2  15*  13*   --   10*          Xr Chest Ap Portable  (cpt=71010)    6/7/2017  CONCLUSION:  1.

## 2017-06-07 NOTE — HOSPICE RN NOTE
Hospice referral follow up    Writer attempted to meet with family to discuss hospice. Calderon Corado from Spiritual care informed me that patients spouse was having a hard time after receiving prognosis.   Spoke briefly with patients son and daughter who reporte

## 2017-06-07 NOTE — PROGRESS NOTES
[facility]    Progress Note    Molly Cortez Patient Status:  Inpatient    1950 MRN [id number]   Location [facility] 2W/SW Attending Alexandr Lino MD   Hosp Day # 3 PCP Natalie Peña MD      Molly Cortez is a 78 year cardiopulmonary disease. Minimal perihilar and bibasilar atelectasis/scarring. 2. Suboptimal inspiration. Moderate elevation right hemidiaphragm.          Ir Biopsy    6/6/2017  CONCLUSION: Ultrasound-guided fine needle biopsy of the left inguinal lymph nod

## 2017-06-07 NOTE — PROGRESS NOTES
[facility]    Progress Note    Araceli Peguero Patient Status:  Inpatient    1950 MRN [id number]   Location [facility] 2W/SW Attending Linda Oquendo MD   Hosp Day # 3 PCP Shayne Townsend MD       SUBJECTIVE:  Pt awake, sedat        FINDINGS:         CARDIAC/VASC:          Normal cardiac silhouette.  .  Unremarkable pulmonary vasculature.           MEDIAST/BENNY:            Atherosclerotic aorta with no visible aneurysm.     LUNGS/PLEURA:         Biapical thickening.  Suboptimal 5,000 Units Subcutaneous 2 times per day   Piperacillin Sod-Tazobactam So (ZOSYN) 3.375 g in dextrose 5 % 100 mL IVPB 3.375 g Intravenous Q12H   docusate sodium (COLACE) cap 100 mg 100 mg Oral BID   bisacodyl (DULCOLAX) rectal suppository 10 mg 10 mg Recta

## 2017-06-07 NOTE — PROGRESS NOTES
06/07/17 1418   Clinical Encounter Type   Visited With Family   Routine Visit Follow-up   Continue Visiting Yes   Crisis Visit Critical care   Family Spiritual Encounters   Family Coping Anxiety;Open/discussion; Sadness   Family Participation in Care 5

## 2017-06-07 NOTE — PROGRESS NOTES
[place] [facility]    Brief Note    Samy Zamudio Patient Status:  Inpatient    1950 MRN [id number]   Location [facility] 2W/SW Attending Demarcus Neal MD   [address] Day # 3 PCP Dayton Rojas MD     Date of Note:  2017  Time of No

## 2017-06-07 NOTE — PROGRESS NOTES
INFECTIOUS DISEASE PROGRESS NOTE    Alanna Wong Patient Status:  Inpatient    1950 MRN [id number]   Location [facility] 5SW/SE Attending Nataliya Murray MD   Hosp Day # 3 PCP Ave Montiel MD     Subjective:  Patient seen and examined, no about 3-5 days possibly related to urinary tract infection versus worsening bladder tumor.  No evidence of hydronephrosis to suggests an outlet obstruction.  No evidence of pyelonephritis clinically.    # Rule out impending C. difficile colitis.  Patient on

## 2017-06-07 NOTE — CM/SW NOTE
CTL update. Palliative Care APRULA MartinesSarah Poli met with family at bedside. Hospice recommended. [facility]osp[facility] referral made to [facility].  Family in agreement to meet with hospice team.  May transfer to 4th floor if stabilizes.   Family's ultimate goal is to take her h

## 2017-06-08 NOTE — PROGRESS NOTES
Stopped by. Offered patient family support. Discussed case. She remains in a rapidly deteriorating condition. Family (, son and daughter) at bedside. Offered them any assistance or help they may need.

## 2017-06-08 NOTE — SIGNIFICANT EVENT
Death Note:  Patient  at 12:21 a.m. No spont respirations. No palpable pulses. Family at bedside. [facility] called. Primary service to be notified. Cresencio Lopez

## 2017-06-08 NOTE — PROGRESS NOTES
06/07/17 1527   Clinical Encounter Type   Visited With Patient  (And friends)   Routine Visit Follow-up   Referral From Nurse   Referral To    Patient Spiritual Encounters   Spiritual Assessment Completed 1   Spiritual Needs  extremely di

## 2017-06-08 NOTE — TELEPHONE ENCOUNTER
Jazmine Livingston calling from [facility] hospice and asked that Dr Susanna Ram to call back @ [phone]. Yahaira West

## 2017-06-08 NOTE — PROGRESS NOTES
06/07/17 1942   Clinical Encounter Type   Visited With Patient and family together   Routine Visit Follow-up   Continue Visiting Yes   Crisis Visit Patient actively dying   Patient's Supportive Strategies/Resources Spouse, Children, Extended Family    F

## 2017-06-08 NOTE — PROGRESS NOTES
[facility] NOTIFIED OF PT'S DEATH. PT IS CANDIDATE FOR [facility] DONATION ONLY. SPOKE TO Daniel Nixno. REFERENCE #[id number]. WILL PERFORM POST-MORTEM CARE PER APPROPRIATE GUIDELINES.

## 2017-06-08 NOTE — PROGRESS NOTES
POST-MORTEM CARE COMPLETE. EYE CARE PERFORMED PER REQUEST (SALINE DROPS, EYE LIDS TAPED SHUT, ICE OVER EYES, HEAD ELEVATED ON ROLLED TOWEL). BODY SENT TO [facility] AT THIS TIME W/ ALL APPROPRIATE PAPERWORK.

## 2017-06-12 NOTE — DISCHARGE SUMMARY
[facility]    Death/Expiration note    Yeyo Tobar Patient Status:  Inpatient    1950 MRN [id number]   Location [facility] 2W/SW Attending No att. providers found   Hosp Day # 3 PCP Marcos Barrett MD         Date and Time

## 2018-04-11 NOTE — TELEPHONE ENCOUNTER
Noted. Thank you. danilo Reyes, see below for update per your request from yesterday. Thank you. PRE-SEDATION ASSESSMENT    CONSENT  Consent for procedure and sedation obtained: Yes    MEDICAL HISTORY       PHYSICAL EXAM  History and Physical Reviewed: Patient has valid H&P within 30 days. I have reviewed and there are no changes.  Airway Risk History: No previous complications  Airway Anatomy : Class II  Heart : Normal  Lungs : Abnormal (Diminished on R)  LOC/Mental Status : Normal    OTHER FINDINGS  Reviewed current medications and allergies: Yes  Pertinent lab/diagnostic test reviewed: Yes    SEDATION RISK ASSESSMENT  Risk Status ASA: Class III - Severe systemic disease, limits activity, is not incapacitated  Plan for Sedation: Moderate Sedation  EKG Monitoring: Yes    NARRATIVE FINDINGS
